# Patient Record
Sex: MALE | Race: WHITE | NOT HISPANIC OR LATINO | Employment: OTHER | URBAN - METROPOLITAN AREA
[De-identification: names, ages, dates, MRNs, and addresses within clinical notes are randomized per-mention and may not be internally consistent; named-entity substitution may affect disease eponyms.]

---

## 2017-01-04 ENCOUNTER — OFFICE VISIT (OUTPATIENT)
Dept: NEUROSURGERY | Facility: CLINIC | Age: 57
End: 2017-01-04
Payer: COMMERCIAL

## 2017-01-04 VITALS
DIASTOLIC BLOOD PRESSURE: 82 MMHG | TEMPERATURE: 98 F | SYSTOLIC BLOOD PRESSURE: 122 MMHG | HEART RATE: 63 BPM | BODY MASS INDEX: 27.86 KG/M2 | WEIGHT: 199 LBS | HEIGHT: 71 IN

## 2017-01-04 DIAGNOSIS — M48.061 LUMBAR STENOSIS: Primary | ICD-10-CM

## 2017-01-04 DIAGNOSIS — M71.30: ICD-10-CM

## 2017-01-04 PROCEDURE — 99999 PR PBB SHADOW E&M-EST. PATIENT-LVL III: CPT | Mod: PBBFAC,,, | Performed by: NEUROLOGICAL SURGERY

## 2017-01-04 PROCEDURE — 99024 POSTOP FOLLOW-UP VISIT: CPT | Mod: S$GLB,,, | Performed by: NEUROLOGICAL SURGERY

## 2017-01-04 NOTE — PROGRESS NOTES
"Subjective:    I, Keesha Griffiths, am scribing for, and in the presence of, Dr. Silas Castillo.     Patient ID: Ramone Nguyen is a 56 y.o. male.    Chief Complaint: Follow-up    HPI This is a 56-year-old man who presents today 7 weeks PO L3-L4 laminectomy and medial facetectomy with decompression of nerve roots and resection of synovial cyst on 11/14/2016.  He describes persistent burning in the bilateral posterior thighs, but states the foot pain has improved. Otherwise, he is doing well. He states that the incision is healing well.    The patient is fairly active and walking near a mile.     Review of Systems   Constitutional: Negative for activity change, fatigue and fever.   HENT: Negative for facial swelling.    Eyes: Negative.    Respiratory: Negative.    Cardiovascular: Negative.    Gastrointestinal: Negative for diarrhea, nausea and vomiting.   Genitourinary: Negative.    Musculoskeletal: Positive for myalgias. Negative for back pain and joint swelling.   Neurological: Negative for seizures, weakness, numbness and headaches.   Psychiatric/Behavioral: Negative.        Past Medical History   Diagnosis Date    Coronary artery disease     MI (myocardial infarction)      Objective:       Visit Vitals    /82    Pulse 63    Temp 98 °F (36.7 °C)    Ht 5' 11" (1.803 m)    Wt 90.3 kg (199 lb)    BMI 27.75 kg/m2       Physical Exam   Constitutional: He is oriented to person, place, and time. He appears well-developed and well-nourished.   HENT:   Head: Normocephalic and atraumatic.   Neck: Neck supple.   Neurological: He is alert and oriented to person, place, and time. No cranial nerve deficit. He displays a negative Romberg sign. GCS eye subscore is 4. GCS verbal subscore is 5. GCS motor subscore is 6.         I, Dr. Silas Castillo, personally performed the services described in this documentation as scribed by Keesha Griffiths in my presence, and it is both accurate and complete.  Assessment:       Lumbar " synovial cyst.    Plan:   The patient is doing well 6 weeks post-op L3-L4 laminectomy and medial facetectomy with decompression of nerve roots and resection of synovial cyst on 11/14/2016. I will schedule him follow up in 6 months. He should contact me sooner with any new issues or concerns.

## 2017-01-04 NOTE — MR AVS SNAPSHOT
[facility] - Neurosurgery   [address]  [facility] [address]  Phone: [phone]                  Ramone Nguyen   2017 11:15 AM   Office Visit    Description:  Male : 1960   Provider:  Silas Castillo MD   Department:  [facility] - Neurosurgery 7th Fl           Reason for Visit     Follow-up                To Do List           Goals (5 Years of Data)     None      [facility]     [facility] On Call Nurse Care Line -  Assistance  Registered nurses in the [facility] Center provide clinical advisement, health education, appointment booking, and other advisory services.  Call for this free service at [phone].             Medications           Message regarding Medications     Verify the changes and/or additions to your medication regime listed below are the same as discussed with your clinician today.  If any of these changes or additions are incorrect, please notify your healthcare provider.             Verify that the below list of medications is an accurate representation of the medications you are currently taking.  If none reported, the list may be blank. If incorrect, please contact your healthcare provider. Carry this list with you in case of emergency.           Current Medications     aspirin (ECOTRIN) 81 MG EC tablet Take 1 tablet (81 mg total) by mouth once daily.    baclofen (LIORESAL) 10 MG tablet Take 10 mg by mouth 3 (three) times daily.     carvedilol (COREG) 12.5 MG tablet Take 1 tablet (12.5 mg total) by mouth 2 (two) times daily with meals.    cyanocobalamin, vitamin B-12, (VITAMIN B-12) 500 mcg TbSR Take by mouth.    fish oil-omega-3 fatty acids 300-1,000 mg capsule Take 2 g by mouth once daily.    hydrocodone-acetaminophen 7.5-325mg (NORCO) 7.5-325 mg per tablet Take 1 tablet by mouth every 4 (four) hours as needed.    LYRICA 75 mg capsule 75 mg 4 (four) times daily.     multivitamin capsule Take 1 capsule by mouth once daily.     rosuvastatin  "(CRESTOR) 20 MG tablet Take 1 tablet (20 mg total) by mouth every evening.    UBIQUINOL, BULK, MISC 100 mg by Misc.(Non-Drug; Combo Route) route once daily.            Clinical Reference Information           Vital Signs - Last Recorded  Most recent update: 1/4/2017 11:16 AM by Tina Shah MA    BP Pulse Temp Ht Wt BMI    122/82 63 98 °F (36.7 °C) 5' 11" (1.803 m) 90.3 kg (199 lb) 27.75 kg/m2      Blood Pressure          Most Recent Value    BP  122/82      Allergies as of 1/4/2017     Ace Inhibitors      Immunizations Administered on Date of Encounter - 1/4/2017     None      "

## 2017-05-24 ENCOUNTER — LAB VISIT (OUTPATIENT)
Dept: LAB | Facility: HOSPITAL | Age: 57
End: 2017-05-24
Attending: INTERNAL MEDICINE
Payer: OTHER MISCELLANEOUS

## 2017-05-24 DIAGNOSIS — E78.5 HYPERLIPEMIA: ICD-10-CM

## 2017-05-24 LAB
ALBUMIN SERPL BCP-MCNC: 3.9 G/DL
ALP SERPL-CCNC: 44 U/L
ALT SERPL W/O P-5'-P-CCNC: 24 U/L
ANION GAP SERPL CALC-SCNC: 7 MMOL/L
AST SERPL-CCNC: 26 U/L
BILIRUB SERPL-MCNC: 0.6 MG/DL
BUN SERPL-MCNC: 15 MG/DL
CALCIUM SERPL-MCNC: 9.4 MG/DL
CHLORIDE SERPL-SCNC: 107 MMOL/L
CHOLEST/HDLC SERPL: 2.9 {RATIO}
CO2 SERPL-SCNC: 26 MMOL/L
CREAT SERPL-MCNC: 0.9 MG/DL
EST. GFR  (AFRICAN AMERICAN): >60 ML/MIN/1.73 M^2
EST. GFR  (NON AFRICAN AMERICAN): >60 ML/MIN/1.73 M^2
GLUCOSE SERPL-MCNC: 90 MG/DL
HDL/CHOLESTEROL RATIO: 34.6 %
HDLC SERPL-MCNC: 156 MG/DL
HDLC SERPL-MCNC: 54 MG/DL
LDLC SERPL CALC-MCNC: 78.2 MG/DL
NONHDLC SERPL-MCNC: 102 MG/DL
POTASSIUM SERPL-SCNC: 4.1 MMOL/L
PROT SERPL-MCNC: 7.1 G/DL
SODIUM SERPL-SCNC: 140 MMOL/L
TRIGL SERPL-MCNC: 119 MG/DL

## 2017-05-24 PROCEDURE — 80061 LIPID PANEL: CPT

## 2017-05-24 PROCEDURE — 80053 COMPREHEN METABOLIC PANEL: CPT

## 2017-05-24 PROCEDURE — 36415 COLL VENOUS BLD VENIPUNCTURE: CPT | Mod: PO

## 2017-06-05 RX ORDER — ROSUVASTATIN CALCIUM 20 MG/1
20 TABLET, COATED ORAL NIGHTLY
Qty: 90 TABLET | Refills: 0 | Status: SHIPPED | OUTPATIENT
Start: 2017-06-05 | End: 2017-06-14 | Stop reason: SDUPTHER

## 2017-06-05 RX ORDER — CARVEDILOL 12.5 MG/1
12.5 TABLET ORAL 2 TIMES DAILY WITH MEALS
Qty: 180 TABLET | Refills: 0 | Status: SHIPPED | OUTPATIENT
Start: 2017-06-05 | End: 2017-06-14 | Stop reason: SDUPTHER

## 2017-06-14 ENCOUNTER — OFFICE VISIT (OUTPATIENT)
Dept: CARDIOLOGY | Facility: CLINIC | Age: 57
End: 2017-06-14
Payer: OTHER MISCELLANEOUS

## 2017-06-14 VITALS
HEART RATE: 64 BPM | HEIGHT: 70 IN | SYSTOLIC BLOOD PRESSURE: 131 MMHG | WEIGHT: 198.63 LBS | DIASTOLIC BLOOD PRESSURE: 86 MMHG | BODY MASS INDEX: 28.44 KG/M2

## 2017-06-14 DIAGNOSIS — I25.10 ATHEROSCLEROSIS OF NATIVE CORONARY ARTERY OF NATIVE HEART WITHOUT ANGINA PECTORIS: Primary | ICD-10-CM

## 2017-06-14 DIAGNOSIS — E78.5 HYPERLIPIDEMIA, UNSPECIFIED HYPERLIPIDEMIA TYPE: ICD-10-CM

## 2017-06-14 DIAGNOSIS — I25.5 CARDIOMYOPATHY, ISCHEMIC: ICD-10-CM

## 2017-06-14 DIAGNOSIS — Z98.61 S/P PTCA (PERCUTANEOUS TRANSLUMINAL CORONARY ANGIOPLASTY): ICD-10-CM

## 2017-06-14 DIAGNOSIS — I25.2 OLD MI (MYOCARDIAL INFARCTION): ICD-10-CM

## 2017-06-14 PROCEDURE — 99999 PR PBB SHADOW E&M-EST. PATIENT-LVL III: CPT | Mod: PBBFAC,,, | Performed by: INTERNAL MEDICINE

## 2017-06-14 PROCEDURE — 99213 OFFICE O/P EST LOW 20 MIN: CPT | Mod: S$GLB,,, | Performed by: INTERNAL MEDICINE

## 2017-06-14 RX ORDER — CARVEDILOL 12.5 MG/1
12.5 TABLET ORAL 2 TIMES DAILY WITH MEALS
Qty: 180 TABLET | Refills: 3 | Status: SHIPPED | OUTPATIENT
Start: 2017-06-14 | End: 2017-08-22 | Stop reason: SDUPTHER

## 2017-06-14 RX ORDER — ROSUVASTATIN CALCIUM 20 MG/1
20 TABLET, COATED ORAL NIGHTLY
Qty: 90 TABLET | Refills: 3 | Status: SHIPPED | OUTPATIENT
Start: 2017-06-14 | End: 2017-08-22 | Stop reason: SDUPTHER

## 2017-06-14 NOTE — PROGRESS NOTES
Subjective:    Patient ID:  Ramone Nguyen is a 56 y.o. male who presents for follow-up of Atherosclerosis of native coronary artery without angina pec      HPI  Mr. Nguyen has no symptoms to suggest exertional angina.. He has no    symptoms of chf. He has no symptoms of dysrhythmia. He has never had    syncope.       He did have an episode of chest discomfort described as an elephant sitting on his chest in March 2016.  He was evaluated at his local hospital in [place], [place].  There was no heart attack and apparently no evidence of new coronary events.  These symptoms have not recurred and he and his wife believe they're likely due to stress that he was having at the time.     He had recent cervical spine surgery and is recovering well.  No regular exercise - wt up a bit.   As noted previously, he notices a purplish discoloration to his right foot when he is sitting.  He has no claudication.       He developed angioedema in several locations with ace-i, and I am   reluctant to try an ARB.      He developed acute anterior MI in [facility] longterm 1997 with delay    getting to [facility] at which point LAD occluded and ptca performed. Went on    to develop large anterior scar. Was a  and cigarette smoker at    the time, both of which have been discontinued.     CHRONIC CONDITIONS:    - Old myocardial infarction    - CHR ISCHEMIC HRT DIS NEC - with decision made by EP not to place AICD    Contraindication to ace-i and ARB    - Other and unspecified hyperlipidemia - not at goal but has had    problems symptomatically with higher dose statin and wants to try better    diet, wt loss, etc.    - Percutaneous transluminal coronary angioplasty status    - Coronary atherosclerosis of native coronary vessel     05/24/17 0854 HDL 54 - Final result   05/24/17 0854 CHOL 156 - Final result   05/24/17 0854 TRIG 119 - Final result   05/24/17 0854 LDLCALC 78.2 - Final result   Liver wnl    Current Outpatient  "Prescriptions   Medication Sig Dispense Refill    aspirin (ECOTRIN) 81 MG EC tablet Take 1 tablet (81 mg total) by mouth once daily.  0    baclofen (LIORESAL) 10 MG tablet Take 10 mg by mouth 3 (three) times daily.       carvedilol (COREG) 12.5 MG tablet Take 1 tablet (12.5 mg total) by mouth 2 (two) times daily with meals. 180 tablet 0    cyanocobalamin, vitamin B-12, (VITAMIN B-12) 500 mcg TbSR Take by mouth.      fish oil-omega-3 fatty acids 300-1,000 mg capsule Take 2 g by mouth once daily.      hydrocodone-acetaminophen 7.5-325mg (NORCO) 7.5-325 mg per tablet Take 1 tablet by mouth every 4 (four) hours as needed. 90 tablet 0    LYRICA 75 mg capsule 75 mg 4 (four) times daily.       multivitamin capsule Take 1 capsule by mouth once daily.       rosuvastatin (CRESTOR) 20 MG tablet Take 1 tablet (20 mg total) by mouth every evening. 90 tablet 0    UBIQUINOL, BULK, MISC 100 mg by Misc.(Non-Drug; Combo Route) route once daily.        No current facility-administered medications for this visit.             Review of Systems   Constitution: Positive for malaise/fatigue. Negative for night sweats, weight gain and weight loss.   HENT: Negative for headaches.    Eyes: Negative for visual disturbance.   Cardiovascular: Negative for chest pain, dyspnea on exertion, leg swelling, palpitations and syncope.   Respiratory: Negative for cough and shortness of breath.    Hematologic/Lymphatic: Does not bruise/bleed easily.   Skin: Negative for rash.   Musculoskeletal: Positive for back pain and neck pain.   Gastrointestinal: Negative for abdominal pain, change in bowel habit and nausea.   Neurological: Positive for paresthesias. Negative for dizziness and light-headedness.        Objective:    Physical Exam   Constitutional:   /86 (BP Location: Left arm, Patient Position: Sitting, BP Method: Automatic)   Pulse 64   Ht 5' 10" (1.778 m)   Wt 90.1 kg (198 lb 10.2 oz)   BMI 28.50 kg/m²      Neck: No JVD present. "   Cardiovascular: Normal rate, regular rhythm, normal heart sounds and intact distal pulses.  Exam reveals no gallop.    No murmur heard.  Pulmonary/Chest: Breath sounds normal. He has no rales.   Abdominal: Soft. Bowel sounds are normal.   Musculoskeletal: He exhibits no edema.         Assessment:       1. Atherosclerosis of native coronary artery of native heart without angina pectoris    2. Cardiomyopathy, ischemic - functional class !   3. Old MI (myocardial infarction)    4. Hyperlipidemia, unspecified hyperlipidemia type - adequate control   5. S/P PTCA (percutaneous transluminal coronary angioplasty)         Plan:

## 2017-07-05 ENCOUNTER — TELEPHONE (OUTPATIENT)
Dept: NEUROSURGERY | Facility: CLINIC | Age: 57
End: 2017-07-05

## 2017-07-05 DIAGNOSIS — G95.9 CERVICAL MYELOPATHY: Primary | ICD-10-CM

## 2017-07-13 ENCOUNTER — TELEPHONE (OUTPATIENT)
Dept: NEUROSURGERY | Facility: CLINIC | Age: 57
End: 2017-07-13

## 2017-07-13 DIAGNOSIS — M71.38 SYNOVIAL CYST OF LUMBAR SPINE: Primary | ICD-10-CM

## 2017-07-13 DIAGNOSIS — M48.061 LUMBAR STENOSIS: Primary | ICD-10-CM

## 2017-07-14 ENCOUNTER — TELEPHONE (OUTPATIENT)
Dept: NEUROSURGERY | Facility: CLINIC | Age: 57
End: 2017-07-14

## 2017-07-14 NOTE — TELEPHONE ENCOUNTER
Called pt to let him know that things were a go with both MRI for 7/18/2017  Pt wife answered and was very pleased with results.

## 2017-07-19 ENCOUNTER — OFFICE VISIT (OUTPATIENT)
Dept: NEUROSURGERY | Facility: CLINIC | Age: 57
End: 2017-07-19
Payer: MEDICARE

## 2017-07-19 ENCOUNTER — HOSPITAL ENCOUNTER (OUTPATIENT)
Dept: RADIOLOGY | Facility: HOSPITAL | Age: 57
Discharge: HOME | End: 2017-07-19
Attending: NEUROLOGICAL SURGERY
Payer: MEDICARE

## 2017-07-19 DIAGNOSIS — M54.16 LUMBAR RADICULOPATHY: Primary | ICD-10-CM

## 2017-07-19 DIAGNOSIS — M48.061 LUMBAR STENOSIS: ICD-10-CM

## 2017-07-19 DIAGNOSIS — G95.9 CERVICAL MYELOPATHY: ICD-10-CM

## 2017-07-19 PROCEDURE — 72156 MRI NECK SPINE W/O & W/DYE: CPT | Mod: 26,GC,, | Performed by: RADIOLOGY

## 2017-07-19 PROCEDURE — 99212 OFFICE O/P EST SF 10 MIN: CPT | Mod: PBBFAC,25 | Performed by: NEUROLOGICAL SURGERY

## 2017-07-19 PROCEDURE — 72158 MRI LUMBAR SPINE W/O & W/DYE: CPT | Mod: 26,GC,, | Performed by: RADIOLOGY

## 2017-07-19 PROCEDURE — 99213 OFFICE O/P EST LOW 20 MIN: CPT | Mod: S$PBB,,, | Performed by: NEUROLOGICAL SURGERY

## 2017-07-19 PROCEDURE — 99999 PR PBB SHADOW E&M-EST. PATIENT-LVL II: CPT | Mod: PBBFAC,,, | Performed by: NEUROLOGICAL SURGERY

## 2017-07-19 RX ORDER — GADOBUTROL 604.72 MG/ML
10 INJECTION INTRAVENOUS
Status: COMPLETED | OUTPATIENT
Start: 2017-07-19 | End: 2017-07-19

## 2017-07-19 RX ADMIN — GADOBUTROL 10 ML: 604.72 INJECTION INTRAVENOUS at 01:07

## 2017-07-19 NOTE — PROGRESS NOTES
Subjective:    I, Keesha Whitley, am scribing for, and in the presence of, Dr. Silas Castillo.     Patient ID: Ramone Nguyen is a 56 y.o. male.    Chief Complaint: No chief complaint on file.    HPI This is a 56-year-old man who presents today for follow up. The patient underwent a L3-L4 laminectomy and medial facetectomy with decompression of nerve roots and resection of synovial cyst on 11/14/2016. At the postop visit, he complained of burning in the bilateral posterior thighs. Today, the patient reports intermittent, but persistent BLE pain that radiates into his toes with some difficulty ambulating. He states that activity exacerbates his pain. He reports taking Norco 2-3 times per week as needed for episodes of severe pain. He also takes Lyrica. The patient also complains of intermittent numbness in the LUE and tingling on his scalp.     Review of Systems   Constitutional: Negative for activity change, fatigue and fever.   HENT: Negative for facial swelling.    Eyes: Negative.    Respiratory: Negative.    Cardiovascular: Negative.    Gastrointestinal: Negative for diarrhea, nausea and vomiting.   Genitourinary: Negative.    Musculoskeletal: Positive for gait problem and myalgias (BLE). Negative for back pain and joint swelling.   Neurological: Positive for numbness (LUE). Negative for seizures, weakness and headaches.        +Tingling on his scalp   Psychiatric/Behavioral: Negative.        Past Medical History:   Diagnosis Date    Coronary artery disease     MI (myocardial infarction)        Objective:     There were no vitals taken for this visit.    Physical Exam   Constitutional: He is oriented to person, place, and time. He appears well-developed and well-nourished.   HENT:   Head: Normocephalic and atraumatic.   Neck: Neck supple.   Neurological: He is alert and oriented to person, place, and time. No cranial nerve deficit. He displays a negative Romberg sign. GCS eye subscore is 4. GCS verbal subscore  is 5. GCS motor subscore is 6.       Imaging:  MRI of the lumbar spine w/wo contrast, dated 7/19/2017, shows some degenerative changes at L5/S1 without spinal stenosis.     MRI of the cervical spine w/wo contrast, dated 7/19/2017, shows no significant spinal stenosis or neural foraminal stenosis.     I have personally reviewed the images with the pt.      I, Dr. Silas Castillo, personally performed the services described in this documentation as scribed by Keesha Whitley in my presence, and it is both accurate and complete.  Assessment:       1. Lumbar radiculopathy        Plan:   At this point, I do not recommend any other surgical options for this patient. I will refer the patient to Pain Management for treatment. He can follow up with me as needed for any new symptoms or concerns.

## 2017-07-19 NOTE — LETTER
July 21, 2017      Danilo Weinstein MD  [address]  [facility] [address]           [facility] 7th Fl  [address]  [place] [address]  Phone: [phone]          Patient: Ramone Nguyen   MR Number: [id number]   YOB: 1960   Date of Visit: 7/19/2017       Dear Dr. Danilo Weinstein:    Thank you for referring Ramone Nguyen to me for evaluation. Attached you will find relevant portions of my assessment and plan of care.    If you have questions, please do not hesitate to call me. I look forward to following Ramone Nguyen along with you.    Sincerely,    Silas Castillo MD    Enclosure  CC:  No Recipients    If you would like to receive this communication electronically, please contact [email or web address] or [phone] to request more information on [employer] access.    For providers and/or their staff who would like to refer a patient to [facility], please contact us through our one-stop-shop provider referral line, [facility], at [phone].    If you feel you have received this communication in error or would no longer like to receive these types of communications, please e-mail [email or web address]

## 2017-07-19 NOTE — PATIENT INSTRUCTIONS
At this point, I do not recommend any other surgical options for this patient. I will refer the patient to Pain Management for treatment. He can follow up with me as needed for any new symptoms or concerns.

## 2017-08-22 RX ORDER — CARVEDILOL 12.5 MG/1
12.5 TABLET ORAL 2 TIMES DAILY WITH MEALS
Qty: 180 TABLET | Refills: 3 | Status: SHIPPED | OUTPATIENT
Start: 2017-08-22 | End: 2018-08-31 | Stop reason: SDUPTHER

## 2017-08-22 RX ORDER — ROSUVASTATIN CALCIUM 20 MG/1
20 TABLET, COATED ORAL NIGHTLY
Qty: 90 TABLET | Refills: 3 | Status: SHIPPED | OUTPATIENT
Start: 2017-08-22 | End: 2018-08-30 | Stop reason: SDUPTHER

## 2017-08-22 NOTE — TELEPHONE ENCOUNTER
----- Message from Conchita Kim sent at 8/22/2017  9:33 AM CDT -----  Contact: pt   Pt called to get refills on the rosuvastatin (CRESTOR) 20 MG tablet and the carvedilol (COREG) 12.5 MG tablet.  The pt is requesting 90 day refill sent to the [id number] - [place], [place] - [address]  He is a pt of Dr. Gordon and LOV 6/17.  The pt can be reached @ [phone].  Thanks!!

## 2018-04-25 ENCOUNTER — TELEPHONE (OUTPATIENT)
Dept: CARDIOLOGY | Facility: CLINIC | Age: 58
End: 2018-04-25

## 2018-04-25 NOTE — TELEPHONE ENCOUNTER
----- Message from Jone Vasques sent at 4/25/2018  8:34 AM CDT -----  Contact: patient  Please call pt at [phone]. Patient is requesting an appt with Dr Gordon only and stated that Dr Gordon will see him    Thank you

## 2018-05-08 ENCOUNTER — LAB VISIT (OUTPATIENT)
Dept: LAB | Facility: HOSPITAL | Age: 58
End: 2018-05-08
Attending: INTERNAL MEDICINE
Payer: OTHER MISCELLANEOUS

## 2018-05-08 DIAGNOSIS — E78.5 HYPERLIPIDEMIA, UNSPECIFIED HYPERLIPIDEMIA TYPE: ICD-10-CM

## 2018-05-08 DIAGNOSIS — I25.10 ATHEROSCLEROSIS OF NATIVE CORONARY ARTERY OF NATIVE HEART WITHOUT ANGINA PECTORIS: ICD-10-CM

## 2018-05-08 LAB
ALBUMIN SERPL BCP-MCNC: 3.9 G/DL
ALP SERPL-CCNC: 49 U/L
ALT SERPL W/O P-5'-P-CCNC: 28 U/L
ANION GAP SERPL CALC-SCNC: 10 MMOL/L
AST SERPL-CCNC: 30 U/L
BILIRUB SERPL-MCNC: 0.7 MG/DL
BUN SERPL-MCNC: 13 MG/DL
CALCIUM SERPL-MCNC: 9.7 MG/DL
CHLORIDE SERPL-SCNC: 106 MMOL/L
CHOLEST SERPL-MCNC: 141 MG/DL
CHOLEST/HDLC SERPL: 2.9 {RATIO}
CO2 SERPL-SCNC: 25 MMOL/L
CREAT SERPL-MCNC: 0.9 MG/DL
EST. GFR  (AFRICAN AMERICAN): >60 ML/MIN/1.73 M^2
EST. GFR  (NON AFRICAN AMERICAN): >60 ML/MIN/1.73 M^2
GLUCOSE SERPL-MCNC: 93 MG/DL
HDLC SERPL-MCNC: 48 MG/DL
HDLC SERPL: 34 %
LDLC SERPL CALC-MCNC: 65.6 MG/DL
NONHDLC SERPL-MCNC: 93 MG/DL
POTASSIUM SERPL-SCNC: 4.3 MMOL/L
PROT SERPL-MCNC: 7.2 G/DL
SODIUM SERPL-SCNC: 141 MMOL/L
TRIGL SERPL-MCNC: 137 MG/DL

## 2018-05-08 PROCEDURE — 80061 LIPID PANEL: CPT

## 2018-05-08 PROCEDURE — 80053 COMPREHEN METABOLIC PANEL: CPT

## 2018-05-08 PROCEDURE — 36415 COLL VENOUS BLD VENIPUNCTURE: CPT | Mod: PO

## 2018-05-15 ENCOUNTER — OFFICE VISIT (OUTPATIENT)
Dept: CARDIOLOGY | Facility: CLINIC | Age: 58
End: 2018-05-15
Payer: OTHER MISCELLANEOUS

## 2018-05-15 VITALS
SYSTOLIC BLOOD PRESSURE: 137 MMHG | HEIGHT: 70 IN | WEIGHT: 203.25 LBS | DIASTOLIC BLOOD PRESSURE: 81 MMHG | BODY MASS INDEX: 29.1 KG/M2 | HEART RATE: 69 BPM

## 2018-05-15 DIAGNOSIS — I25.5 CARDIOMYOPATHY, ISCHEMIC: ICD-10-CM

## 2018-05-15 DIAGNOSIS — I25.2 OLD MI (MYOCARDIAL INFARCTION): ICD-10-CM

## 2018-05-15 DIAGNOSIS — Z98.61 S/P PTCA (PERCUTANEOUS TRANSLUMINAL CORONARY ANGIOPLASTY): ICD-10-CM

## 2018-05-15 DIAGNOSIS — I25.10 ATHEROSCLEROSIS OF NATIVE CORONARY ARTERY OF NATIVE HEART WITHOUT ANGINA PECTORIS: Primary | ICD-10-CM

## 2018-05-15 DIAGNOSIS — E78.5 HYPERLIPIDEMIA, UNSPECIFIED HYPERLIPIDEMIA TYPE: ICD-10-CM

## 2018-05-15 PROCEDURE — 99213 OFFICE O/P EST LOW 20 MIN: CPT | Mod: S$GLB,,, | Performed by: INTERNAL MEDICINE

## 2018-05-15 PROCEDURE — 99999 PR PBB SHADOW E&M-EST. PATIENT-LVL III: CPT | Mod: PBBFAC,,, | Performed by: INTERNAL MEDICINE

## 2018-08-30 RX ORDER — ROSUVASTATIN CALCIUM 20 MG/1
20 TABLET, COATED ORAL NIGHTLY
Qty: 90 TABLET | Refills: 3 | Status: SHIPPED | OUTPATIENT
Start: 2018-08-30 | End: 2019-08-16 | Stop reason: SDUPTHER

## 2018-08-30 NOTE — TELEPHONE ENCOUNTER
----- Message from Bess Green sent at 8/30/2018  3:31 PM CDT -----  Contact: pt called   Pt need need a refill on rosuvastatin (CRESTOR) 20 MG tablet, carvedilol (COREG) 12.5 MG tablet and send to [facility] [place] - [place], [place] - [address] [place] [phone] (Phone)[phone] (Fax). Last visit  5/15/18 Dr. Gordon. Thank you.

## 2018-08-31 RX ORDER — CARVEDILOL 12.5 MG/1
12.5 TABLET ORAL 2 TIMES DAILY WITH MEALS
Qty: 180 TABLET | Refills: 3 | Status: SHIPPED | OUTPATIENT
Start: 2018-08-31 | End: 2019-08-16 | Stop reason: SDUPTHER

## 2018-08-31 NOTE — TELEPHONE ENCOUNTER
----- Message from Bess Green sent at 8/31/2018 10:11 AM CDT -----  Contact: pt called   Pt need a refill on medication carvedilol (COREG) 12.5 MG tablet and send to [facility] [place] - [place], [place] - [address] [place] [phone] (Phone)  [phone] (Fax). Last visit 5/15/18 Dr. Gordon. Thank you.

## 2019-01-08 ENCOUNTER — TELEPHONE (OUTPATIENT)
Dept: CARDIOLOGY | Facility: CLINIC | Age: 59
End: 2019-01-08

## 2019-01-08 NOTE — TELEPHONE ENCOUNTER
----- Message from Bess Green sent at 1/8/2019  3:57 PM CST -----  Contact: Maryann Wolf has been calling several times regarding a workmans  comp issue. Please call Maryann @ [phone] and fax [phone]. Thank you.

## 2019-05-08 ENCOUNTER — LAB VISIT (OUTPATIENT)
Dept: LAB | Facility: HOSPITAL | Age: 59
End: 2019-05-08
Attending: INTERNAL MEDICINE
Payer: OTHER MISCELLANEOUS

## 2019-05-08 DIAGNOSIS — I25.10 ATHEROSCLEROSIS OF NATIVE CORONARY ARTERY OF NATIVE HEART WITHOUT ANGINA PECTORIS: ICD-10-CM

## 2019-05-08 DIAGNOSIS — E78.5 HYPERLIPIDEMIA, UNSPECIFIED HYPERLIPIDEMIA TYPE: ICD-10-CM

## 2019-05-08 LAB
ALBUMIN SERPL BCP-MCNC: 4 G/DL (ref 3.5–5.2)
ALP SERPL-CCNC: 47 U/L (ref 55–135)
ALT SERPL W/O P-5'-P-CCNC: 27 U/L (ref 10–44)
ANION GAP SERPL CALC-SCNC: 7 MMOL/L (ref 8–16)
AST SERPL-CCNC: 28 U/L (ref 10–40)
BASOPHILS # BLD AUTO: 0.04 K/UL (ref 0–0.2)
BASOPHILS NFR BLD: 0.7 % (ref 0–1.9)
BILIRUB SERPL-MCNC: 0.6 MG/DL (ref 0.1–1)
BUN SERPL-MCNC: 19 MG/DL (ref 6–20)
CALCIUM SERPL-MCNC: 9.8 MG/DL (ref 8.7–10.5)
CHLORIDE SERPL-SCNC: 104 MMOL/L (ref 95–110)
CHOLEST SERPL-MCNC: 165 MG/DL (ref 120–199)
CHOLEST/HDLC SERPL: 3 {RATIO} (ref 2–5)
CO2 SERPL-SCNC: 29 MMOL/L (ref 23–29)
CREAT SERPL-MCNC: 0.9 MG/DL (ref 0.5–1.4)
DIFFERENTIAL METHOD: ABNORMAL
EOSINOPHIL # BLD AUTO: 0.1 K/UL (ref 0–0.5)
EOSINOPHIL NFR BLD: 2.4 % (ref 0–8)
ERYTHROCYTE [DISTWIDTH] IN BLOOD BY AUTOMATED COUNT: 12.7 % (ref 11.5–14.5)
EST. GFR  (AFRICAN AMERICAN): >60 ML/MIN/1.73 M^2
EST. GFR  (NON AFRICAN AMERICAN): >60 ML/MIN/1.73 M^2
GLUCOSE SERPL-MCNC: 79 MG/DL (ref 70–110)
HCT VFR BLD AUTO: 50 % (ref 40–54)
HDLC SERPL-MCNC: 55 MG/DL (ref 40–75)
HDLC SERPL: 33.3 % (ref 20–50)
HGB BLD-MCNC: 16.7 G/DL (ref 14–18)
IMM GRANULOCYTES # BLD AUTO: 0.01 K/UL (ref 0–0.04)
IMM GRANULOCYTES NFR BLD AUTO: 0.2 % (ref 0–0.5)
LDLC SERPL CALC-MCNC: 83.4 MG/DL (ref 63–159)
LYMPHOCYTES # BLD AUTO: 2 K/UL (ref 1–4.8)
LYMPHOCYTES NFR BLD: 35.1 % (ref 18–48)
MCH RBC QN AUTO: 31.9 PG (ref 27–31)
MCHC RBC AUTO-ENTMCNC: 33.4 G/DL (ref 32–36)
MCV RBC AUTO: 96 FL (ref 82–98)
MONOCYTES # BLD AUTO: 0.5 K/UL (ref 0.3–1)
MONOCYTES NFR BLD: 8.9 % (ref 4–15)
NEUTROPHILS # BLD AUTO: 3 K/UL (ref 1.8–7.7)
NEUTROPHILS NFR BLD: 52.7 % (ref 38–73)
NONHDLC SERPL-MCNC: 110 MG/DL
NRBC BLD-RTO: 0 /100 WBC
PLATELET # BLD AUTO: 162 K/UL (ref 150–350)
PMV BLD AUTO: 10.7 FL (ref 9.2–12.9)
POTASSIUM SERPL-SCNC: 4.3 MMOL/L (ref 3.5–5.1)
PROT SERPL-MCNC: 7.3 G/DL (ref 6–8.4)
RBC # BLD AUTO: 5.23 M/UL (ref 4.6–6.2)
SODIUM SERPL-SCNC: 140 MMOL/L (ref 136–145)
TRIGL SERPL-MCNC: 133 MG/DL (ref 30–150)
WBC # BLD AUTO: 5.75 K/UL (ref 3.9–12.7)

## 2019-05-08 PROCEDURE — 80053 COMPREHEN METABOLIC PANEL: CPT

## 2019-05-08 PROCEDURE — 80061 LIPID PANEL: CPT

## 2019-05-08 PROCEDURE — 85025 COMPLETE CBC W/AUTO DIFF WBC: CPT

## 2019-05-08 PROCEDURE — 36415 COLL VENOUS BLD VENIPUNCTURE: CPT | Mod: PO

## 2019-05-13 ENCOUNTER — LAB VISIT (OUTPATIENT)
Dept: LAB | Facility: HOSPITAL | Age: 59
End: 2019-05-13
Attending: INTERNAL MEDICINE
Payer: MEDICARE

## 2019-05-13 ENCOUNTER — OFFICE VISIT (OUTPATIENT)
Dept: CARDIOLOGY | Facility: CLINIC | Age: 59
End: 2019-05-13
Payer: MEDICARE

## 2019-05-13 VITALS
DIASTOLIC BLOOD PRESSURE: 74 MMHG | SYSTOLIC BLOOD PRESSURE: 130 MMHG | HEART RATE: 58 BPM | OXYGEN SATURATION: 97 % | BODY MASS INDEX: 29.19 KG/M2 | WEIGHT: 197.06 LBS | HEIGHT: 69 IN

## 2019-05-13 DIAGNOSIS — E89.0 POSTABLATIVE HYPOTHYROIDISM: ICD-10-CM

## 2019-05-13 DIAGNOSIS — I25.10 ATHEROSCLEROSIS OF NATIVE CORONARY ARTERY OF NATIVE HEART WITHOUT ANGINA PECTORIS: Primary | ICD-10-CM

## 2019-05-13 DIAGNOSIS — E78.5 HYPERLIPIDEMIA, UNSPECIFIED HYPERLIPIDEMIA TYPE: ICD-10-CM

## 2019-05-13 DIAGNOSIS — I25.5 CARDIOMYOPATHY, ISCHEMIC: ICD-10-CM

## 2019-05-13 DIAGNOSIS — E55.9 VITAMIN D DEFICIENCY: ICD-10-CM

## 2019-05-13 DIAGNOSIS — Z78.9 STATIN INTOLERANCE: ICD-10-CM

## 2019-05-13 DIAGNOSIS — G95.9 CERVICAL MYELOPATHY: ICD-10-CM

## 2019-05-13 DIAGNOSIS — I10 ESSENTIAL HYPERTENSION: ICD-10-CM

## 2019-05-13 LAB
25(OH)D3+25(OH)D2 SERPL-MCNC: 27 NG/ML (ref 30–96)
TSH SERPL DL<=0.005 MIU/L-ACNC: 1.69 UIU/ML (ref 0.4–4)

## 2019-05-13 PROCEDURE — 99999 PR PBB SHADOW E&M-EST. PATIENT-LVL III: CPT | Mod: PBBFAC,,, | Performed by: INTERNAL MEDICINE

## 2019-05-13 PROCEDURE — 99214 PR OFFICE/OUTPT VISIT, EST, LEVL IV, 30-39 MIN: ICD-10-PCS | Mod: S$PBB,,, | Performed by: INTERNAL MEDICINE

## 2019-05-13 PROCEDURE — 36415 COLL VENOUS BLD VENIPUNCTURE: CPT

## 2019-05-13 PROCEDURE — 99214 OFFICE O/P EST MOD 30 MIN: CPT | Mod: S$PBB,,, | Performed by: INTERNAL MEDICINE

## 2019-05-13 PROCEDURE — 82306 VITAMIN D 25 HYDROXY: CPT

## 2019-05-13 PROCEDURE — 84443 ASSAY THYROID STIM HORMONE: CPT

## 2019-05-13 PROCEDURE — 99999 PR PBB SHADOW E&M-EST. PATIENT-LVL III: ICD-10-PCS | Mod: PBBFAC,,, | Performed by: INTERNAL MEDICINE

## 2019-05-13 PROCEDURE — 99213 OFFICE O/P EST LOW 20 MIN: CPT | Mod: PBBFAC | Performed by: INTERNAL MEDICINE

## 2019-05-13 NOTE — PROGRESS NOTES
Subjective:   Chief Complaint: Atherosclerosis of native coronary artery of native heart wi and Congestive Heart Failure  Last Clinic Visit:  05/15/2018 with Dr. Gordon    History of Present Illness: Ramone Nguyen is a 58 y.o. gentleman with coronary artery disease, ischemic cardiomyopathy, hypertension, hyperlipidemia, cervical arthritis, lumbar arthritis, who presents to follow-up, he was most recently seen one year prior by Dr. Gordon.  He reports largely doing well within the past year, denies any recurrent chest pain, no change in baseline shortness of breath.  He continues to report ongoing mild fatigue, but no orthopnea, no lower extremity edema.  He denies any claudication, does get some cramps and discoloration in his right leg during sleep, but this is unchanged.  He reports compliance with his medications, also watching his diet, attempts to limited excessive red meat or saturated fats.  He is somewhat physically active but does not exercise aerobically on a regular basis secondary to orthopedic issues.  He has Ace induced angioedema, thus no additional role for these medications.  Have started Aldactone for cardiomyopathy in the past, but now stopped.  He also has declined ICD in the past.  He denies any syncope, no presyncope, no palpitations or other symptoms concerning for arrhythmia.  LDL noted to have creeped up slightly over the course the past year to the 80s from 60s, he endorses compliance with Crestor 20 which he has been on for several years    From Dr. Gordon's last note:  He developed acute anterior MI in [facility] skilled nursing 1997 with delay    getting to [facility] at which point LAD occluded and ptca performed. Went on    to develop large anterior scar. Was a  and cigarette smoker at    the time, both of which have been discontinued.  - CHR ISCHEMIC HRT DIS NEC - with decision made by EP not to place AICD    Contraindication to ace-i and ARB    - Other and unspecified  hyperlipidemia - not at goal but has had    problems symptomatically with higher dose statin and wants to try better    diet, wt loss, etc.      PMHx:  ACE-inhibitor angioedema  Ischemic cardiomyopathy ejection fraction 30% 2012  Coronary artery disease status post delayed revascularization 1997, PCI LAD  History of tobacco use  Chronic aches in arms and legs secondary to myelopathy versus statin  Osteoarthritis    Review of Systems   Constitution: Positive for malaise/fatigue.   HENT: Negative.    Eyes: Negative.    Cardiovascular: Negative.    Respiratory: Negative.    Hematologic/Lymphatic: Negative.    Skin: Negative.    Musculoskeletal: Negative.    Gastrointestinal: Negative.    Genitourinary: Negative.      Medications:  Current Outpatient Medications on File Prior to Visit   Medication Sig    aspirin (ECOTRIN) 81 MG EC tablet Take 1 tablet (81 mg total) by mouth once daily.    baclofen (LIORESAL) 10 MG tablet Take 10 mg by mouth 3 (three) times daily.     carvedilol (COREG) 12.5 MG tablet Take 1 tablet (12.5 mg total) by mouth 2 (two) times daily with meals.    cyanocobalamin, vitamin B-12, (VITAMIN B-12) 500 mcg TbSR Take by mouth.    fish oil-omega-3 fatty acids 300-1,000 mg capsule Take 2 g by mouth once daily.    hydrocodone-acetaminophen 7.5-325mg (NORCO) 7.5-325 mg per tablet Take 1 tablet by mouth every 4 (four) hours as needed.    LYRICA 75 mg capsule 75 mg 4 (four) times daily.     multivitamin capsule Take 1 capsule by mouth once daily.     rosuvastatin (CRESTOR) 20 MG tablet Take 1 tablet (20 mg total) by mouth every evening.    UBIQUINOL, BULK, MISC 100 mg by Misc.(Non-Drug; Combo Route) route once daily.      No current facility-administered medications on file prior to visit.      Family History:  Ramone's family history includes Hyperlipidemia in his mother.    Social History:  Ramone reports that he quit smoking about 22 years ago. He does not have any smokeless tobacco  "history on file. He reports that he drinks alcohol. He reports that he does not use drugs.    Objective:   /74   Pulse (!) 58   Ht 5' 9" (1.753 m)   Wt 89.4 kg (197 lb 1.5 oz)   SpO2 97%   BMI 29.11 kg/m²     Physical Exam   Constitutional: He is oriented to person, place, and time and well-developed, well-nourished, and in no distress. No distress.   HENT:   Head: Normocephalic and atraumatic.   Mouth/Throat: No oropharyngeal exudate.   Eyes: EOM are normal. No scleral icterus.   Neck: No JVD present. No tracheal deviation present. No thyromegaly present.   Cardiovascular: Normal rate and regular rhythm. Exam reveals no gallop and no friction rub.   No murmur heard.  2+ pulses bilateral lower extremities, but noted to have mildly delayed capillary refill right lower extremity   Pulmonary/Chest: Effort normal and breath sounds normal. No respiratory distress. He has no wheezes. He has no rales. He exhibits no tenderness.   Abdominal: Soft. He exhibits no distension. There is no tenderness. There is no rebound and no guarding.   Musculoskeletal: Normal range of motion. He exhibits no edema.   Neurological: He is alert and oriented to person, place, and time.   Skin: Skin is warm and dry. He is not diaphoretic. No erythema.   Psychiatric: Affect normal.     EKG:  My independent visualization of most recent EKG is normal sinus rhythm, anterior infarct with t-wave inversions across precordial leads.    TTE:  11/01/2012  CONCLUSIONS     1 - Mild left ventricular enlargement.     2 - Moderately depressed left ventricular function (EF 30%).     3 - RWMA.   SPECT  11/01/2012  Impression: ABNORMAL MYOCARDIAL PERFUSION  1. The perfusion scan is free of evidence for myocardial ischemia. Sensitivity is impaired due to beta blocker therapy.   2. There is a large size fixed defect of severe intensity that extends from the mid to the apical anterior and anteroseptal walls of the left ventricle, consistent with " myocardial injury.   3. There is abnormal wall motion at rest showing akinesis of the anterior,  anteroseptal and apical walls of the left ventricle.   4. There is resting LV dysfunction with a reduced ejection fraction of 34 %.  (normal is >= 51%)  5. The ventricular volumes are normal at rest and stress.   6. The extracardiac distribution of radioactivity is normal.   7. When compared to the previous study from 09/29/2011, no significant change.there are no significant interval changes in the perfusion pattern..     Lipids:  Recent Labs   Lab 05/08/19  0815   LDL CHOLESTEROL 83.4   HDL 55   CHOLESTEROL 165      Renal:  Recent Labs   Lab 05/08/19  0815   POTASSIUM 4.3   CO2 29   BUN BLD 19   CREATININE 0.9     Liver:  Recent Labs   Lab 05/08/19  0815   AST 28   ALT 27         Assessment:     1. Atherosclerosis of native coronary artery of native heart without angina pectoris    2. Cardiomyopathy, ischemic    3. Hyperlipidemia, unspecified hyperlipidemia type    4. Essential hypertension    5. Cervical myelopathy    6. Vitamin D deficiency        Plan:   1. Atherosclerosis of native coronary artery of native heart without angina pectoris  Will follow-up lipid panel in 6 months, encouraged diet with uptake in LDL.  Given extensive statin intolerance in the past explained other options such as Repatha which he was not interested in.  Also discussed Zetia, he reports being on this in the past but was stopped in 2014 for unclear reasons.  Will follow-up CK as it has been mildly elevated in the past  - Lipid panel; Future  - CK; Future    2. Cardiomyopathy, ischemic  Declined ICD, continue Coreg 12, have tried Aldactone in the past but has stopped    3. Hyperlipidemia, unspecified hyperlipidemia type  As above, rechecking lipids in 6 months after diet    4. Essential hypertension  Blood pressure at goal, he reports running in the 100-110 at home, encouraged him to get a blood pressure cuff and keep log    5. Cervical  myelopathy  Continue to follow with orthopedics    6. Vitamin D deficiency  Will check vitamin-D given statin intolerance, as well as TSH  - VITAMIN D; Future      Follow up in about 6 months (around 11/13/2019).

## 2019-05-14 ENCOUNTER — TELEPHONE (OUTPATIENT)
Dept: CARDIOLOGY | Facility: CLINIC | Age: 59
End: 2019-05-14

## 2019-05-14 DIAGNOSIS — R79.89 LOW VITAMIN D LEVEL: Primary | ICD-10-CM

## 2019-05-14 DIAGNOSIS — E55.9 VITAMIN D DEFICIENCY: ICD-10-CM

## 2019-05-14 RX ORDER — VIT C/E/ZN/COPPR/LUTEIN/ZEAXAN 250MG-90MG
1000 CAPSULE ORAL DAILY
Qty: 90 CAPSULE | Refills: 3 | Status: SHIPPED | OUTPATIENT
Start: 2019-05-14

## 2019-05-14 NOTE — TELEPHONE ENCOUNTER
Vitamin-D levels noted to be low, called explained association of low vitamin-D with statin intolerance will supplement vitamin-D, follow-up levels in 3 months    -Naatn Siegel

## 2019-05-15 ENCOUNTER — PATIENT MESSAGE (OUTPATIENT)
Dept: CARDIOLOGY | Facility: CLINIC | Age: 59
End: 2019-05-15

## 2019-08-16 RX ORDER — ROSUVASTATIN CALCIUM 20 MG/1
TABLET, COATED ORAL
Qty: 90 TABLET | Refills: 3 | Status: SHIPPED | OUTPATIENT
Start: 2019-08-16 | End: 2020-09-22

## 2019-08-16 RX ORDER — CARVEDILOL 12.5 MG/1
TABLET ORAL
Qty: 180 TABLET | Refills: 3 | Status: SHIPPED | OUTPATIENT
Start: 2019-08-16 | End: 2020-09-11

## 2019-11-11 ENCOUNTER — LAB VISIT (OUTPATIENT)
Dept: LAB | Facility: HOSPITAL | Age: 59
End: 2019-11-11
Attending: INTERNAL MEDICINE
Payer: MEDICARE

## 2019-11-11 DIAGNOSIS — I25.10 ATHEROSCLEROSIS OF NATIVE CORONARY ARTERY OF NATIVE HEART WITHOUT ANGINA PECTORIS: ICD-10-CM

## 2019-11-11 LAB
CHOLEST SERPL-MCNC: 151 MG/DL (ref 120–199)
CHOLEST/HDLC SERPL: 3.2 {RATIO} (ref 2–5)
CK SERPL-CCNC: 177 U/L (ref 20–200)
HDLC SERPL-MCNC: 47 MG/DL (ref 40–75)
HDLC SERPL: 31.1 % (ref 20–50)
LDLC SERPL CALC-MCNC: 78.2 MG/DL (ref 63–159)
NONHDLC SERPL-MCNC: 104 MG/DL
TRIGL SERPL-MCNC: 129 MG/DL (ref 30–150)

## 2019-11-11 PROCEDURE — 80061 LIPID PANEL: CPT

## 2019-11-11 PROCEDURE — 82550 ASSAY OF CK (CPK): CPT

## 2019-11-11 PROCEDURE — 36415 COLL VENOUS BLD VENIPUNCTURE: CPT | Mod: PO

## 2019-11-12 ENCOUNTER — OFFICE VISIT (OUTPATIENT)
Dept: CARDIOLOGY | Facility: CLINIC | Age: 59
End: 2019-11-12
Payer: OTHER MISCELLANEOUS

## 2019-11-12 VITALS
SYSTOLIC BLOOD PRESSURE: 149 MMHG | HEIGHT: 71 IN | DIASTOLIC BLOOD PRESSURE: 88 MMHG | WEIGHT: 200.19 LBS | BODY MASS INDEX: 28.02 KG/M2 | HEART RATE: 65 BPM

## 2019-11-12 DIAGNOSIS — E55.9 VITAMIN D DEFICIENCY: ICD-10-CM

## 2019-11-12 DIAGNOSIS — I25.5 CARDIOMYOPATHY, ISCHEMIC: Primary | ICD-10-CM

## 2019-11-12 DIAGNOSIS — I25.10 ATHEROSCLEROSIS OF NATIVE CORONARY ARTERY OF NATIVE HEART WITHOUT ANGINA PECTORIS: ICD-10-CM

## 2019-11-12 DIAGNOSIS — R79.89 LOW VITAMIN D LEVEL: ICD-10-CM

## 2019-11-12 DIAGNOSIS — E78.5 HYPERLIPIDEMIA, UNSPECIFIED HYPERLIPIDEMIA TYPE: ICD-10-CM

## 2019-11-12 DIAGNOSIS — I10 ESSENTIAL HYPERTENSION: ICD-10-CM

## 2019-11-12 PROCEDURE — 99214 PR OFFICE/OUTPT VISIT, EST, LEVL IV, 30-39 MIN: ICD-10-PCS | Mod: S$GLB,,, | Performed by: INTERNAL MEDICINE

## 2019-11-12 PROCEDURE — 99999 PR PBB SHADOW E&M-EST. PATIENT-LVL IV: ICD-10-PCS | Mod: PBBFAC,,, | Performed by: INTERNAL MEDICINE

## 2019-11-12 PROCEDURE — 99214 OFFICE O/P EST MOD 30 MIN: CPT | Mod: PBBFAC | Performed by: INTERNAL MEDICINE

## 2019-11-12 PROCEDURE — 99214 OFFICE O/P EST MOD 30 MIN: CPT | Mod: S$GLB,,, | Performed by: INTERNAL MEDICINE

## 2019-11-12 PROCEDURE — 99999 PR PBB SHADOW E&M-EST. PATIENT-LVL IV: CPT | Mod: PBBFAC,,, | Performed by: INTERNAL MEDICINE

## 2019-12-09 ENCOUNTER — PATIENT MESSAGE (OUTPATIENT)
Dept: CARDIOLOGY | Facility: CLINIC | Age: 59
End: 2019-12-09

## 2020-08-10 ENCOUNTER — TELEPHONE (OUTPATIENT)
Dept: NEUROSURGERY | Facility: CLINIC | Age: 60
End: 2020-08-10

## 2020-08-10 NOTE — TELEPHONE ENCOUNTER
Spoke with pt's wife. Pt was seen by neurologist recently for ongoing low back problems, MRI T&L done earlier today. Pt was told no remarkable findings on Thoracic MRI, increased narrowing @ L3/4 on Lumbar MRI. Pt was advised to f/u w/ Dr. Castillo given Hx of 2 prior surgeries with him at this level. Pt is picking up disc with copy of scan, appt made for 8/19. They are in agreement with plan.

## 2020-08-17 ENCOUNTER — OFFICE VISIT (OUTPATIENT)
Dept: NEUROSURGERY | Facility: CLINIC | Age: 60
End: 2020-08-17
Payer: MEDICARE

## 2020-08-17 VITALS
WEIGHT: 196.19 LBS | HEART RATE: 61 BPM | SYSTOLIC BLOOD PRESSURE: 136 MMHG | DIASTOLIC BLOOD PRESSURE: 87 MMHG | TEMPERATURE: 97 F | BODY MASS INDEX: 27.37 KG/M2

## 2020-08-17 DIAGNOSIS — M48.061 SPINAL STENOSIS OF LUMBAR REGION WITHOUT NEUROGENIC CLAUDICATION: Primary | ICD-10-CM

## 2020-08-17 PROCEDURE — 99999 PR PBB SHADOW E&M-EST. PATIENT-LVL IV: CPT | Mod: PBBFAC,,, | Performed by: NEUROLOGICAL SURGERY

## 2020-08-17 PROCEDURE — 99204 OFFICE O/P NEW MOD 45 MIN: CPT | Mod: S$PBB,,, | Performed by: NEUROLOGICAL SURGERY

## 2020-08-17 PROCEDURE — 99214 OFFICE O/P EST MOD 30 MIN: CPT | Mod: PBBFAC | Performed by: NEUROLOGICAL SURGERY

## 2020-08-17 PROCEDURE — 99999 PR PBB SHADOW E&M-EST. PATIENT-LVL IV: ICD-10-PCS | Mod: PBBFAC,,, | Performed by: NEUROLOGICAL SURGERY

## 2020-08-17 PROCEDURE — 99204 PR OFFICE/OUTPT VISIT, NEW, LEVL IV, 45-59 MIN: ICD-10-PCS | Mod: S$PBB,,, | Performed by: NEUROLOGICAL SURGERY

## 2020-08-17 NOTE — H&P (VIEW-ONLY)
Subjective:   I, Eze Holley, attest that this documentation has been prepared under the direction and in the presence of Silas Castillo MD.     Patient ID: Ramone Nguyen is a 59 y.o. male     Chief Complaint: cervical spondylosis, low back pain.      HPI  MrQuinton Nguyen is a pleasant 59 y.o. gentleman with a hx of a C3-4 and C6-7 ACDF on 11/11/2013, Left-sided L3-4 laminectomy for decompression on 04/20/2015, C4-5 ACDF on 05/16/2016, and left-sided L3-4 laminectomy and medial facetectomy with decompression of nerve roots and resection of synovial cyst on 11/14/2016. He presents today for follow up with MRI of lumbar spine.     Pt was last seen by me on 07/19/2017, at which time he complained of intermittent, but persistent BLE pain that radiates into his toes and some difficulty ambulating.    Pt states he has remained fairly active and is still able to mow his lawn. His worst pain at this time is in his left buttock which radiates into his LLE, terminating at the knee, and involving two of his toes. He believes he be more weak on the left side but the chronic weakness in his RLE has remained stable. He is currently on Lyrica 74 mg QID and Baclofen 10 mg TID. He states he was walking approximately 2 miles a day before summer emerged.        Review of Systems   Constitutional: Negative for activity change, appetite change, fatigue, fever and unexpected weight change.   HENT: Negative for facial swelling.    Eyes: Negative.    Respiratory: Negative.    Cardiovascular: Negative.    Gastrointestinal: Negative for diarrhea, nausea and vomiting.   Endocrine: Negative.    Genitourinary: Negative.    Musculoskeletal: Negative for back pain, joint swelling, myalgias and neck pain.        + left buttock and LLE pain   Neurological: Positive for weakness (in LLE; chronic in the RLE). Negative for dizziness, seizures, numbness and headaches.   Psychiatric/Behavioral: Negative.       Past Medical History:    Diagnosis Date    Coronary artery disease     Hyperlipidemia     Hypertension     MI (myocardial infarction)        Objective:      Vitals:    08/17/20 1317   BP: 136/87   Pulse: 61   Temp: 97.3 °F (36.3 °C)      Physical Exam  HENT:      Head: Normocephalic and atraumatic.   Eyes:      Extraocular Movements: Extraocular movements intact.   Neck:      Musculoskeletal: Neck supple.   Pulmonary:      Effort: Pulmonary effort is normal.   Neurological:      Mental Status: He is alert and oriented to person, place, and time.      GCS: GCS eye subscore is 4. GCS verbal subscore is 5. GCS motor subscore is 6.      Cranial Nerves: No cranial nerve deficit.      Deep Tendon Reflexes:      Reflex Scores:       Patellar reflexes are 3+ on the right side and 3+ on the left side.       Achilles reflexes are 3+ on the right side and 3+ on the left side.     Comments: He exhibits weakness with hip flexion in the BLE and difficulty standing on his toes and heels.             IMAGING:  MRI Lumbar spine (outside disc) shows facet arthropathy at L3-4 with significant narrowing. There is some degenerative disc disease at L5-S1.     I have personally reviewed the images with the pt.      I, Dr. Silas Castillo, personally performed the services described in this documentation. All medical record entries made by the scribe, Eze Holley, were at my direction and in my presence.  I have reviewed the chart and agree that the record reflects my personal performance and is accurate and complete. Silas Castillo MD. 08/17/2020    Assessment:       1. Spinal stenosis of lumbar region without neurogenic claudication    2.      Cervical spondylosis      Plan:   I have personally reviewed the MRI lumbar spine with the pt which shows facet arthropathy at L3-4 with significant narrowing. There is some degenerative disc disease at L5-S1.     Will refer the pt to physical therapy to improve his strength and flexibility and refer to pain management  for an MICHAEL L3-4.    I will schedule a follow up visit in 2 months.

## 2020-08-17 NOTE — PATIENT INSTRUCTIONS
I have personally reviewed the MRI lumbar spine with the pt which shows facet arthropathy at L3-4 with significant narrowing. There is some degenerative disc disease at L5-S1.     Will refer the pt to physical therapy to improve his strength and flexibility and refer to pain management for an MICHAEL L3-4.    I will schedule a follow up visit in 2 months.

## 2020-08-18 ENCOUNTER — TELEPHONE (OUTPATIENT)
Dept: NEUROSURGERY | Facility: CLINIC | Age: 60
End: 2020-08-18

## 2020-08-18 DIAGNOSIS — M48.061 SPINAL STENOSIS OF LUMBAR REGION WITHOUT NEUROGENIC CLAUDICATION: Primary | ICD-10-CM

## 2020-08-19 ENCOUNTER — TELEPHONE (OUTPATIENT)
Dept: PAIN MEDICINE | Facility: CLINIC | Age: 60
End: 2020-08-19

## 2020-08-20 ENCOUNTER — TELEPHONE (OUTPATIENT)
Dept: PAIN MEDICINE | Facility: CLINIC | Age: 60
End: 2020-08-20

## 2020-08-21 ENCOUNTER — TELEPHONE (OUTPATIENT)
Dept: PAIN MEDICINE | Facility: CLINIC | Age: 60
End: 2020-08-21

## 2020-08-21 DIAGNOSIS — M48.061 SPINAL STENOSIS OF LUMBAR REGION WITHOUT NEUROGENIC CLAUDICATION: Primary | ICD-10-CM

## 2020-08-31 ENCOUNTER — TELEPHONE (OUTPATIENT)
Dept: NEUROSURGERY | Facility: CLINIC | Age: 60
End: 2020-08-31

## 2020-08-31 NOTE — TELEPHONE ENCOUNTER
Returned pt's call. He is very leary of having the MICHAEL because he has had several surgeris in the area and had a horrible experience with a spinal tap in that area.    Discussed the process of MICHAEL and the rationale. Explained that Dr Castillo is generally intent on pts'd following the POC he establishes with them but that no one can make him do something he does not want to do.    He v/u and thanks.      ----- Message from Anisha Bautista sent at 8/31/2020 10:09 AM CDT -----  Contact: self @ [phone]  Pt would like tos peak with Lex[facility] concerning the procedure his is scheduled for with Dr Leach on 9-3-20.

## 2020-09-02 ENCOUNTER — LAB VISIT (OUTPATIENT)
Dept: LAB | Facility: HOSPITAL | Age: 60
End: 2020-09-02
Attending: INTERNAL MEDICINE
Payer: MEDICARE

## 2020-09-02 DIAGNOSIS — I25.10 ATHEROSCLEROSIS OF NATIVE CORONARY ARTERY OF NATIVE HEART WITHOUT ANGINA PECTORIS: ICD-10-CM

## 2020-09-02 DIAGNOSIS — E55.9 VITAMIN D DEFICIENCY: ICD-10-CM

## 2020-09-02 LAB
25(OH)D3+25(OH)D2 SERPL-MCNC: 34 NG/ML (ref 30–96)
ALBUMIN SERPL BCP-MCNC: 4.1 G/DL (ref 3.5–5.2)
ALP SERPL-CCNC: 49 U/L (ref 55–135)
ALT SERPL W/O P-5'-P-CCNC: 31 U/L (ref 10–44)
ANION GAP SERPL CALC-SCNC: 9 MMOL/L (ref 8–16)
AST SERPL-CCNC: 32 U/L (ref 10–40)
BILIRUB SERPL-MCNC: 0.6 MG/DL (ref 0.1–1)
BUN SERPL-MCNC: 15 MG/DL (ref 6–20)
CALCIUM SERPL-MCNC: 9.3 MG/DL (ref 8.7–10.5)
CHLORIDE SERPL-SCNC: 105 MMOL/L (ref 95–110)
CO2 SERPL-SCNC: 25 MMOL/L (ref 23–29)
CREAT SERPL-MCNC: 0.9 MG/DL (ref 0.5–1.4)
EST. GFR  (AFRICAN AMERICAN): >60 ML/MIN/1.73 M^2
EST. GFR  (NON AFRICAN AMERICAN): >60 ML/MIN/1.73 M^2
GLUCOSE SERPL-MCNC: 88 MG/DL (ref 70–110)
POTASSIUM SERPL-SCNC: 4.3 MMOL/L (ref 3.5–5.1)
PROT SERPL-MCNC: 7.4 G/DL (ref 6–8.4)
SODIUM SERPL-SCNC: 139 MMOL/L (ref 136–145)

## 2020-09-02 PROCEDURE — 82306 VITAMIN D 25 HYDROXY: CPT

## 2020-09-02 PROCEDURE — 80053 COMPREHEN METABOLIC PANEL: CPT

## 2020-09-02 PROCEDURE — 36415 COLL VENOUS BLD VENIPUNCTURE: CPT | Mod: PO

## 2020-09-03 ENCOUNTER — HOSPITAL ENCOUNTER (OUTPATIENT)
Facility: OTHER | Age: 60
Discharge: HOME | End: 2020-09-03
Attending: ANESTHESIOLOGY | Admitting: ANESTHESIOLOGY
Payer: MEDICARE

## 2020-09-03 VITALS
RESPIRATION RATE: 18 BRPM | WEIGHT: 196 LBS | TEMPERATURE: 98 F | BODY MASS INDEX: 28.06 KG/M2 | SYSTOLIC BLOOD PRESSURE: 147 MMHG | OXYGEN SATURATION: 98 % | DIASTOLIC BLOOD PRESSURE: 92 MMHG | HEIGHT: 70 IN | HEART RATE: 59 BPM

## 2020-09-03 DIAGNOSIS — G89.29 CHRONIC PAIN: ICD-10-CM

## 2020-09-03 DIAGNOSIS — M54.17 LUMBOSACRAL RADICULOPATHY: ICD-10-CM

## 2020-09-03 DIAGNOSIS — M51.37 DDD (DEGENERATIVE DISC DISEASE), LUMBOSACRAL: Primary | ICD-10-CM

## 2020-09-03 PROCEDURE — 63600175 PHARM REV CODE 636 W HCPCS: Performed by: ANESTHESIOLOGY

## 2020-09-03 PROCEDURE — 62323 NJX INTERLAMINAR LMBR/SAC: CPT | Mod: ,,, | Performed by: ANESTHESIOLOGY

## 2020-09-03 PROCEDURE — 62323 NJX INTERLAMINAR LMBR/SAC: CPT | Performed by: ANESTHESIOLOGY

## 2020-09-03 PROCEDURE — A4216 STERILE WATER/SALINE, 10 ML: HCPCS | Performed by: ANESTHESIOLOGY

## 2020-09-03 PROCEDURE — 62323 PR INJ LUMBAR/SACRAL, W/IMAGING GUIDANCE: ICD-10-PCS | Mod: ,,, | Performed by: ANESTHESIOLOGY

## 2020-09-03 PROCEDURE — 25000003 PHARM REV CODE 250: Performed by: ANESTHESIOLOGY

## 2020-09-03 PROCEDURE — 25500020 PHARM REV CODE 255: Performed by: ANESTHESIOLOGY

## 2020-09-03 RX ORDER — DEXAMETHASONE SODIUM PHOSPHATE 100 MG/10ML
INJECTION INTRAMUSCULAR; INTRAVENOUS
Status: DISCONTINUED | OUTPATIENT
Start: 2020-09-03 | End: 2020-09-03 | Stop reason: HOSPADM

## 2020-09-03 RX ORDER — SODIUM CHLORIDE 9 MG/ML
500 INJECTION, SOLUTION INTRAVENOUS CONTINUOUS
Status: DISCONTINUED | OUTPATIENT
Start: 2020-09-03 | End: 2020-09-03 | Stop reason: HOSPADM

## 2020-09-03 RX ORDER — LIDOCAINE HYDROCHLORIDE 10 MG/ML
INJECTION, SOLUTION EPIDURAL; INFILTRATION; INTRACAUDAL; PERINEURAL
Status: DISCONTINUED | OUTPATIENT
Start: 2020-09-03 | End: 2020-09-03 | Stop reason: HOSPADM

## 2020-09-03 RX ORDER — LIDOCAINE HYDROCHLORIDE 10 MG/ML
INJECTION INFILTRATION; PERINEURAL
Status: DISCONTINUED | OUTPATIENT
Start: 2020-09-03 | End: 2020-09-03 | Stop reason: HOSPADM

## 2020-09-03 RX ORDER — SODIUM CHLORIDE 9 MG/ML
INJECTION, SOLUTION INTRAMUSCULAR; INTRAVENOUS; SUBCUTANEOUS
Status: DISCONTINUED | OUTPATIENT
Start: 2020-09-03 | End: 2020-09-03 | Stop reason: HOSPADM

## 2020-09-03 NOTE — DISCHARGE INSTRUCTIONS

## 2020-09-03 NOTE — OP NOTE
Lumbar Interlaminar Epidural Steroid Injection under Fluoroscopic Guidance.  Time-out taken to identify patient and procedure side prior to starting the procedure.   I attest that I have reviewed the patient's home medications prior to the procedure and no contraindication have been identified. I  re-evaluated the patient after the patient was positioned for the procedure in the procedure room immediately before the procedural time-out. The vital signs are current and represent the current state of the patient which has not significantly changed since the preprocedure assessment.                                                               Date of Service: 09/03/2020    PCP: Danilo Weinstein MD    Referring Physician:    PROCEDURE:  L3/4 Interlaminar epidural steroid injection under fluoroscopic guidance.    REASONS FOR PROCEDURE: Spinal stenosis of lumbar region without neurogenic claudication [M48.061]   1. DDD (degenerative disc disease), lumbosacral    2. Lumbosacral radiculopathy    3. Chronic pain      POSTOP DIAGNOSIS:  Spinal stenosis of lumbar region without neurogenic claudication [M48.061]     1. DDD (degenerative disc disease), lumbosacral    2. Lumbosacral radiculopathy    3. Chronic pain      PHYSICIAN: Muriel Leach MD  ASSISTANTS: none    MEDICATIONS INJECTED: Preservative-free dexamethasone 10mg with 4mL of sterile Xylocaine-MPF 1% and 1ml of sterile preservative-free normal saline.    LOCAL ANESTHETIC INJECTED:    Xylocaine 1% 9ml with Sodium Bicarbonate 1ml.   SEDATION MEDICATIONS: none    ESTIMATED BLOOD LOSS:  none.    COMPLICATIONS:  none.    TECHNIQUE:  With the patient laying in a prone position, the area was prepped and draped in the usual sterile fashion using ChloraPrep and a fenestrated drape.  Local anesthetic was given using a 27-gauge needle by raising a wheal and going down to the hub of the needle.  A 3.5 inch 20-gauge Touhy needle was introduced under fluoroscopic guidance.  It met  the lamina of the posterior element. The needle was then hinged above the lamina.  Loss of resistance technique was employed while advancing the needle.  Once in the desired position, contrast dye Omnipaque was injected to confirm placement and there was no vascular runoff.  Digital subtraction was employed to confirm that there was no vascular runoff.  The medication was then injected slowly.  The patient tolerated the procedure well.      PAIN BEFORE THE PROCEDURE: 5-9/10    PAIN AFTER THE PROCEDURE: 0/10    The patient was monitored after the procedure.   They were given post-procedure and discharge instructions to follow at home.  The patient was discharged in a stable condition.

## 2020-09-03 NOTE — DISCHARGE SUMMARY
Discharge Note  Short Stay      SUMMARY     Admit Date: 9/3/2020    Attending Physician: Muriel Leach      Discharge Physician: Muriel Leach      Discharge Date: 9/3/2020 12:28 PM    Procedure(s) (LRB):  LUMBAR L3/4 MICHAEL DIRECT REFERRAL (N/A)    Final Diagnosis: Spinal stenosis of lumbar region without neurogenic claudication [M48.061]    Disposition: Home or self care    Patient Instructions:   Current Discharge Medication List      CONTINUE these medications which have NOT CHANGED    Details   aspirin (ECOTRIN) 81 MG EC tablet Take 1 tablet (81 mg total) by mouth once daily.  Refills: 0    Comments: HOLD ASPIRIN X 1 WEEK AFTER SURGERY      baclofen (LIORESAL) 10 MG tablet Take 10 mg by mouth 3 (three) times daily.       carvedilol (COREG) 12.5 MG tablet TAKE ONE TABLET BY MOUTH TWICE DAILY WITH MEALS  Qty: 180 tablet, Refills: 3    Comments: This prescription was filled on 7/26/2019. Any refills authorized will be placed on file.      cholecalciferol, vitamin D3, (VITAMIN D3) 1,000 unit capsule Take 1 capsule (1,000 Units total) by mouth once daily.  Qty: 90 capsule, Refills: 3    Associated Diagnoses: Low vitamin D level      cyanocobalamin, vitamin B-12, (VITAMIN B-12) 500 mcg TbSR Take by mouth.      fish oil-omega-3 fatty acids 300-1,000 mg capsule Take 2 g by mouth once daily.      LYRICA 75 mg capsule 75 mg 4 (four) times daily.       multivitamin capsule Take 1 capsule by mouth once daily.       rosuvastatin (CRESTOR) 20 MG tablet TAKE ONE TABLET BY MOUTH EVERY EVENING  Qty: 90 tablet, Refills: 3    Comments: This prescription was filled on 7/26/2019. Any refills authorized will be placed on file.      UBIQUINOL, BULK, MISC 100 mg by Misc.(Non-Drug; Combo Route) route once daily.                  Discharge Diagnosis: Spinal stenosis of lumbar region without neurogenic claudication [M48.061]  Condition on Discharge: Stable with no complications to procedure   Diet on Discharge: Same as before.  Activity: as per  instruction sheet.  Discharge to: Home with a responsible adult.  Follow up: 2-4 weeks       Please call my office or pager at [phone] if experienced any weakness or loss of sensation, fever > 101.5, pain uncontrolled with oral medications, persistent nausea/vomiting/or diarrhea, redness or drainage from the incisions, or any other worrisome concerns. If physician on call was not reached or could not communicate with our office for any reason please go to the nearest emergency department

## 2020-09-03 NOTE — DISCHARGE SUMMARY
Discharge Note  Short Stay      SUMMARY     Admit Date: 9/3/2020    Attending Physician: Muriel Leach      Discharge Physician: Muriel Leach      Discharge Date: 9/3/2020 12:23 PM    Procedure(s) (LRB):  LUMBAR L3/4 MICHAEL DIRECT REFERRAL (N/A)    Final Diagnosis: Spinal stenosis of lumbar region without neurogenic claudication [M48.061]    Disposition: Home or self care    Patient Instructions:   Current Discharge Medication List      CONTINUE these medications which have NOT CHANGED    Details   aspirin (ECOTRIN) 81 MG EC tablet Take 1 tablet (81 mg total) by mouth once daily.  Refills: 0    Comments: HOLD ASPIRIN X 1 WEEK AFTER SURGERY      baclofen (LIORESAL) 10 MG tablet Take 10 mg by mouth 3 (three) times daily.       carvedilol (COREG) 12.5 MG tablet TAKE ONE TABLET BY MOUTH TWICE DAILY WITH MEALS  Qty: 180 tablet, Refills: 3    Comments: This prescription was filled on 7/26/2019. Any refills authorized will be placed on file.      cholecalciferol, vitamin D3, (VITAMIN D3) 1,000 unit capsule Take 1 capsule (1,000 Units total) by mouth once daily.  Qty: 90 capsule, Refills: 3    Associated Diagnoses: Low vitamin D level      cyanocobalamin, vitamin B-12, (VITAMIN B-12) 500 mcg TbSR Take by mouth.      fish oil-omega-3 fatty acids 300-1,000 mg capsule Take 2 g by mouth once daily.      LYRICA 75 mg capsule 75 mg 4 (four) times daily.       multivitamin capsule Take 1 capsule by mouth once daily.       rosuvastatin (CRESTOR) 20 MG tablet TAKE ONE TABLET BY MOUTH EVERY EVENING  Qty: 90 tablet, Refills: 3    Comments: This prescription was filled on 7/26/2019. Any refills authorized will be placed on file.      UBIQUINOL, BULK, MISC 100 mg by Misc.(Non-Drug; Combo Route) route once daily.                  Discharge Diagnosis: Spinal stenosis of lumbar region without neurogenic claudication [M48.061]  Condition on Discharge: Stable with no complications to procedure   Diet on Discharge: Same as before.  Activity: as per  instruction sheet.  Discharge to: Home with a responsible adult.  Follow up: 2-4 weeks       Please call my office or pager at [phone] if experienced any weakness or loss of sensation, fever > 101.5, pain uncontrolled with oral medications, persistent nausea/vomiting/or diarrhea, redness or drainage from the incisions, or any other worrisome concerns. If physician on call was not reached or could not communicate with our office for any reason please go to the nearest emergency department

## 2020-09-08 ENCOUNTER — PATIENT MESSAGE (OUTPATIENT)
Dept: CARDIOLOGY | Facility: CLINIC | Age: 60
End: 2020-09-08

## 2020-09-16 ENCOUNTER — NURSE TRIAGE (OUTPATIENT)
Dept: ADMINISTRATIVE | Facility: CLINIC | Age: 60
End: 2020-09-16

## 2020-09-18 ENCOUNTER — OFFICE VISIT (OUTPATIENT)
Dept: CARDIOLOGY | Facility: CLINIC | Age: 60
End: 2020-09-18
Payer: MEDICARE

## 2020-09-18 VITALS
DIASTOLIC BLOOD PRESSURE: 94 MMHG | HEIGHT: 70 IN | BODY MASS INDEX: 28.28 KG/M2 | SYSTOLIC BLOOD PRESSURE: 150 MMHG | WEIGHT: 197.56 LBS | HEART RATE: 64 BPM

## 2020-09-18 DIAGNOSIS — I25.10 ATHEROSCLEROSIS OF NATIVE CORONARY ARTERY OF NATIVE HEART WITHOUT ANGINA PECTORIS: Primary | ICD-10-CM

## 2020-09-18 DIAGNOSIS — Z98.61 S/P PTCA (PERCUTANEOUS TRANSLUMINAL CORONARY ANGIOPLASTY): ICD-10-CM

## 2020-09-18 DIAGNOSIS — I25.2 OLD MI (MYOCARDIAL INFARCTION): ICD-10-CM

## 2020-09-18 DIAGNOSIS — E78.00 PURE HYPERCHOLESTEROLEMIA: ICD-10-CM

## 2020-09-18 DIAGNOSIS — I25.5 CARDIOMYOPATHY, ISCHEMIC: ICD-10-CM

## 2020-09-18 PROCEDURE — 99999 PR PBB SHADOW E&M-EST. PATIENT-LVL IV: CPT | Mod: PBBFAC,,, | Performed by: INTERNAL MEDICINE

## 2020-09-18 PROCEDURE — 99213 PR OFFICE/OUTPT VISIT, EST, LEVL III, 20-29 MIN: ICD-10-PCS | Mod: S$PBB,,, | Performed by: INTERNAL MEDICINE

## 2020-09-18 PROCEDURE — 99999 PR PBB SHADOW E&M-EST. PATIENT-LVL IV: ICD-10-PCS | Mod: PBBFAC,,, | Performed by: INTERNAL MEDICINE

## 2020-09-18 PROCEDURE — 99213 OFFICE O/P EST LOW 20 MIN: CPT | Mod: S$PBB,,, | Performed by: INTERNAL MEDICINE

## 2020-09-18 PROCEDURE — 99214 OFFICE O/P EST MOD 30 MIN: CPT | Mod: PBBFAC | Performed by: INTERNAL MEDICINE

## 2020-09-18 RX ORDER — SILDENAFIL 100 MG/1
100 TABLET, FILM COATED ORAL DAILY PRN
Qty: 30 TABLET | Refills: 3 | Status: SHIPPED | OUTPATIENT
Start: 2020-09-18 | End: 2021-09-18

## 2020-09-18 NOTE — PROGRESS NOTES
Subjective:    Patient ID:  Ramone Nguyen is a 59 y.o. male who presents for follow-up of Cardiomyopathy (11 month f/u )      HPI  Mr. Nguyen has no symptoms to suggest exertional angina.. He has no    symptoms of chf. He has no symptoms of dysrhythmia. He has never had    syncope.       He did have an episode of chest discomfort described as an elephant sitting on his chest in March 2016.  He was evaluated at his local hospital in [place], [place].  There was no heart attack and apparently no evidence of new coronary events.  These symptoms have not recurred and he and his wife believe they're likely due to stress that he was having at the time.     He has had cervical spine surgery and has some residual problems in both legs.  Doing PT in [place]    As noted previously, he notices a purplish discoloration to his right foot when he is sitting.  He has no claudication.       He developed angioedema in several locations with ace-i, and I am   reluctant to try an ARB.      He developed acute anterior MI in [facility] 1997 with delay    getting to [facility] at which point LAD occluded and ptca performed. Went on    to develop large anterior scar. Was a  and cigarette smoker at    the time, both of which have been discontinued.  On Workmen's Comp b/o NOFD     CHRONIC CONDITIONS:    - Old myocardial infarction    - [facility] ISCHEMIC HRT DIS NEC - with decision made by EP not to place AICD    Contraindication to ace-i and ARB    - Other and unspecified hyperlipidemia - not at goal but has had    problems symptomatically with higher dose statin and wants to try better    diet, wt loss, etc.    - Percutaneous transluminal coronary angioplasty status    - Coronary atherosclerosis of native coronary vessel     Current Outpatient Medications   Medication Sig Dispense Refill    aspirin (ECOTRIN) 81 MG EC tablet Take 1 tablet (81 mg total) by mouth once daily.  0    baclofen (LIORESAL) 10 MG tablet Take 10  "mg by mouth 3 (three) times daily.       carvediloL (COREG) 12.5 MG tablet Take 1 tablet (12.5 mg total) by mouth 2 (two) times daily with meals. 180 tablet 3    cholecalciferol, vitamin D3, (VITAMIN D3) 1,000 unit capsule Take 1 capsule (1,000 Units total) by mouth once daily. 90 capsule 3    cyanocobalamin, vitamin B-12, (VITAMIN B-12) 500 mcg TbSR Take by mouth.      fish oil-omega-3 fatty acids 300-1,000 mg capsule Take 2 g by mouth once daily.      LYRICA 75 mg capsule 75 mg 4 (four) times daily.       multivitamin capsule Take 1 capsule by mouth once daily.       rosuvastatin (CRESTOR) 20 MG tablet TAKE ONE TABLET BY MOUTH EVERY EVENING 90 tablet 3    UBIQUINOL, BULK, MISC 100 mg by Misc.(Non-Drug; Combo Route) route once daily.        No current facility-administered medications for this visit.        Review of Systems   Constitution: Negative for malaise/fatigue, night sweats, weight gain and weight loss.   Eyes: Negative for blurred vision and visual disturbance.   Cardiovascular: Negative for chest pain and palpitations.   Respiratory: Negative for cough and shortness of breath.    Hematologic/Lymphatic: Does not bruise/bleed easily.   Skin: Negative for rash.   Musculoskeletal: Positive for back pain and neck pain. Negative for joint swelling.   Gastrointestinal: Negative for abdominal pain, change in bowel habit, nausea and vomiting.   Neurological: Positive for numbness and paresthesias. Negative for headaches.        Objective:    Physical Exam   Constitutional:   BP (!) 150/94 (BP Location: Left arm, Patient Position: Sitting, BP Method: Medium (Automatic))   Pulse 64   Ht 5' 10" (1.778 m)   Wt 89.6 kg (197 lb 8.5 oz)   BMI 28.34 kg/m²      Neck: No JVD present.   Cardiovascular: Normal rate, regular rhythm, normal heart sounds and intact distal pulses. Exam reveals no gallop.   No murmur heard.  Pulmonary/Chest: Breath sounds normal. He has no rales.   Abdominal: Soft. Bowel sounds are " normal.   Musculoskeletal:         General: No edema.         Assessment:       1. Atherosclerosis of native coronary artery of native heart without angina pectoris    2. Cardiomyopathy, ischemic    3. Old MI (myocardial infarction)    4. Pure hypercholesterolemia    5. S/P PTCA (percutaneous transluminal coronary angioplasty)         Plan:       Continue same meds  See in 6 months with labs

## 2020-09-22 RX ORDER — ROSUVASTATIN CALCIUM 20 MG/1
TABLET, COATED ORAL
Qty: 90 TABLET | Refills: 3 | Status: SHIPPED | OUTPATIENT
Start: 2020-09-22 | End: 2021-09-21 | Stop reason: SDUPTHER

## 2020-10-21 ENCOUNTER — OFFICE VISIT (OUTPATIENT)
Dept: NEUROSURGERY | Facility: CLINIC | Age: 60
End: 2020-10-21
Payer: MEDICARE

## 2020-10-21 VITALS
BODY MASS INDEX: 28.47 KG/M2 | TEMPERATURE: 98 F | SYSTOLIC BLOOD PRESSURE: 125 MMHG | HEART RATE: 59 BPM | DIASTOLIC BLOOD PRESSURE: 80 MMHG | WEIGHT: 198.44 LBS

## 2020-10-21 DIAGNOSIS — G95.9 CERVICAL MYELOPATHY: ICD-10-CM

## 2020-10-21 DIAGNOSIS — M48.061 SPINAL STENOSIS OF LUMBAR REGION WITHOUT NEUROGENIC CLAUDICATION: Primary | ICD-10-CM

## 2020-10-21 DIAGNOSIS — M47.816 LUMBAR SPONDYLOSIS: ICD-10-CM

## 2020-10-21 PROCEDURE — 99999 PR PBB SHADOW E&M-EST. PATIENT-LVL III: ICD-10-PCS | Mod: PBBFAC,,, | Performed by: NEUROLOGICAL SURGERY

## 2020-10-21 PROCEDURE — 99999 PR PBB SHADOW E&M-EST. PATIENT-LVL III: CPT | Mod: PBBFAC,,, | Performed by: NEUROLOGICAL SURGERY

## 2020-10-21 PROCEDURE — 99214 OFFICE O/P EST MOD 30 MIN: CPT | Mod: S$PBB,,, | Performed by: NEUROLOGICAL SURGERY

## 2020-10-21 PROCEDURE — 99214 PR OFFICE/OUTPT VISIT, EST, LEVL IV, 30-39 MIN: ICD-10-PCS | Mod: S$PBB,,, | Performed by: NEUROLOGICAL SURGERY

## 2020-10-21 PROCEDURE — 99213 OFFICE O/P EST LOW 20 MIN: CPT | Mod: PBBFAC | Performed by: NEUROLOGICAL SURGERY

## 2020-10-21 NOTE — PATIENT INSTRUCTIONS
Pt advised to continue to exercise on a daily basis. He will follow up with me as needed and was advised to contact us with any questions, concerns, or if he experiences any new or worsening symptoms.

## 2020-10-21 NOTE — PROGRESS NOTES
Subjective:   I, Eze Holley, attest that this documentation has been prepared under the direction and in the presence of Silas Castillo MD.     Patient ID: Ramone Nguyen is a 60 y.o. male     Chief Complaint: No chief complaint on file.          HPI  Mr. Ramone Nguyen is a pleasant 60 y.o. gentleman with a hx of a C3-4 and C6-7 ACDF on 11/11/2013, Left-sided L3-4 laminectomy for decompression on 04/20/2015, C4-5 ACDF on 05/16/2016, and left-sided L3-4 laminectomy and medial facetectomy with decompression of nerve roots and resection of synovial cyst on 11/14/2016. Pt was recently seen in clinic on 08/17/2020 due to left buttock pain that radiates into his LLE, terminating at the knee, and involving two of his toes. He also endorsed left-sided weakness and has chronic weakness in the RLE that has remained stable.     He presents today for his 2 month follow up s/p PT and L3/4 MICHAEL which was performed on 09/03/2020. Pt states the thigh pain has since subsided, despite continued intermittent shooting pain down the lateral aspect of his LLE. He states he had to temporarily discontinue physical therapy but plans on resuming soon.       Review of Systems   Constitutional: Negative for activity change, appetite change, fatigue, fever and unexpected weight change.   HENT: Negative for facial swelling.    Eyes: Negative.    Respiratory: Negative.    Cardiovascular: Negative.    Gastrointestinal: Negative for diarrhea, nausea and vomiting.   Endocrine: Negative.    Genitourinary: Negative.    Musculoskeletal: Negative for back pain, joint swelling, myalgias and neck pain.        + intermittent pain in LLE   Neurological: Negative for dizziness, seizures, weakness, numbness and headaches.   Psychiatric/Behavioral: Negative.       Past Medical History:   Diagnosis Date    Coronary artery disease     Hyperlipidemia     Hypertension     MI (myocardial infarction)        Objective:      Vitals:    10/21/20 1120    BP: 125/80   Pulse: (!) 59   Temp: 97.5 °F (36.4 °C)      Physical Exam  HENT:      Head: Normocephalic and atraumatic.   Neck:      Musculoskeletal: Neck supple.   Neurological:      Mental Status: He is alert and oriented to person, place, and time.      GCS: GCS eye subscore is 4. GCS verbal subscore is 5. GCS motor subscore is 6.      Cranial Nerves: Cranial nerves are intact. No cranial nerve deficit.      Sensory: Sensation is intact.      Motor: Motor function is intact.            I, Dr. Silas Castillo, personally performed the services described in this documentation. All medical record entries made by the scribe, Eze Holley, were at my direction and in my presence.  I have reviewed the chart and agree that the record reflects my personal performance and is accurate and complete. Silas Castillo MD. 10/21/2020    Assessment:       Cervical spondylosis.  Lumbar spondylosis.     Plan:     Pt advised to continue to exercise on a daily basis. He will follow up with me as needed and was advised to contact us with any questions, concerns, or if he experiences any new or worsening symptoms.

## 2021-02-12 ENCOUNTER — PATIENT MESSAGE (OUTPATIENT)
Dept: CARDIOLOGY | Facility: CLINIC | Age: 61
End: 2021-02-12

## 2021-02-17 ENCOUNTER — LAB VISIT (OUTPATIENT)
Dept: LAB | Facility: HOSPITAL | Age: 61
End: 2021-02-17
Attending: INTERNAL MEDICINE
Payer: OTHER MISCELLANEOUS

## 2021-02-17 DIAGNOSIS — I25.5 CARDIOMYOPATHY, ISCHEMIC: ICD-10-CM

## 2021-02-17 LAB
ALBUMIN SERPL BCP-MCNC: 4 G/DL (ref 3.5–5.2)
ALP SERPL-CCNC: 45 U/L (ref 55–135)
ALT SERPL W/O P-5'-P-CCNC: 25 U/L (ref 10–44)
ANION GAP SERPL CALC-SCNC: 8 MMOL/L (ref 8–16)
AST SERPL-CCNC: 30 U/L (ref 10–40)
BILIRUB SERPL-MCNC: 0.5 MG/DL (ref 0.1–1)
BUN SERPL-MCNC: 11 MG/DL (ref 6–20)
CALCIUM SERPL-MCNC: 9 MG/DL (ref 8.7–10.5)
CHLORIDE SERPL-SCNC: 104 MMOL/L (ref 95–110)
CHOLEST SERPL-MCNC: 141 MG/DL (ref 120–199)
CHOLEST/HDLC SERPL: 3 {RATIO} (ref 2–5)
CO2 SERPL-SCNC: 27 MMOL/L (ref 23–29)
CREAT SERPL-MCNC: 1 MG/DL (ref 0.5–1.4)
EST. GFR  (AFRICAN AMERICAN): >60 ML/MIN/1.73 M^2
EST. GFR  (NON AFRICAN AMERICAN): >60 ML/MIN/1.73 M^2
GLUCOSE SERPL-MCNC: 91 MG/DL (ref 70–110)
HDLC SERPL-MCNC: 47 MG/DL (ref 40–75)
HDLC SERPL: 33.3 % (ref 20–50)
LDLC SERPL CALC-MCNC: 64.2 MG/DL (ref 63–159)
NONHDLC SERPL-MCNC: 94 MG/DL
POTASSIUM SERPL-SCNC: 4.4 MMOL/L (ref 3.5–5.1)
PROT SERPL-MCNC: 7.2 G/DL (ref 6–8.4)
SODIUM SERPL-SCNC: 139 MMOL/L (ref 136–145)
TRIGL SERPL-MCNC: 149 MG/DL (ref 30–150)

## 2021-02-17 PROCEDURE — 80053 COMPREHEN METABOLIC PANEL: CPT

## 2021-02-17 PROCEDURE — 36415 COLL VENOUS BLD VENIPUNCTURE: CPT | Mod: PO

## 2021-02-17 PROCEDURE — 80061 LIPID PANEL: CPT

## 2021-02-23 ENCOUNTER — OFFICE VISIT (OUTPATIENT)
Dept: CARDIOLOGY | Facility: CLINIC | Age: 61
End: 2021-02-23
Payer: OTHER MISCELLANEOUS

## 2021-02-23 VITALS
SYSTOLIC BLOOD PRESSURE: 149 MMHG | BODY MASS INDEX: 29.42 KG/M2 | HEIGHT: 70 IN | DIASTOLIC BLOOD PRESSURE: 79 MMHG | HEART RATE: 62 BPM | WEIGHT: 205.5 LBS

## 2021-02-23 DIAGNOSIS — I25.2 OLD MI (MYOCARDIAL INFARCTION): ICD-10-CM

## 2021-02-23 DIAGNOSIS — I25.5 CARDIOMYOPATHY, ISCHEMIC: ICD-10-CM

## 2021-02-23 DIAGNOSIS — E78.00 PURE HYPERCHOLESTEROLEMIA: ICD-10-CM

## 2021-02-23 DIAGNOSIS — I25.10 ATHEROSCLEROSIS OF NATIVE CORONARY ARTERY OF NATIVE HEART WITHOUT ANGINA PECTORIS: Primary | ICD-10-CM

## 2021-02-23 DIAGNOSIS — Z98.61 S/P PTCA (PERCUTANEOUS TRANSLUMINAL CORONARY ANGIOPLASTY): ICD-10-CM

## 2021-02-23 DIAGNOSIS — I10 ESSENTIAL HYPERTENSION: ICD-10-CM

## 2021-02-23 PROCEDURE — 99213 PR OFFICE/OUTPT VISIT, EST, LEVL III, 20-29 MIN: ICD-10-PCS | Mod: S$GLB,,, | Performed by: INTERNAL MEDICINE

## 2021-02-23 PROCEDURE — 99999 PR PBB SHADOW E&M-EST. PATIENT-LVL IV: ICD-10-PCS | Mod: PBBFAC,,, | Performed by: INTERNAL MEDICINE

## 2021-02-23 PROCEDURE — 99213 OFFICE O/P EST LOW 20 MIN: CPT | Mod: S$GLB,,, | Performed by: INTERNAL MEDICINE

## 2021-02-23 PROCEDURE — 99214 OFFICE O/P EST MOD 30 MIN: CPT | Mod: PBBFAC | Performed by: INTERNAL MEDICINE

## 2021-02-23 PROCEDURE — 99999 PR PBB SHADOW E&M-EST. PATIENT-LVL IV: CPT | Mod: PBBFAC,,, | Performed by: INTERNAL MEDICINE

## 2021-02-23 RX ORDER — AMLODIPINE BESYLATE 5 MG/1
5 TABLET ORAL DAILY
Qty: 90 TABLET | Refills: 3 | Status: SHIPPED | OUTPATIENT
Start: 2021-02-23 | End: 2021-07-27 | Stop reason: SINTOL

## 2021-03-25 ENCOUNTER — OFFICE VISIT (OUTPATIENT)
Dept: FAMILY MEDICINE | Facility: CLINIC | Age: 61
End: 2021-03-25
Payer: MEDICARE

## 2021-03-25 VITALS
DIASTOLIC BLOOD PRESSURE: 80 MMHG | WEIGHT: 202.38 LBS | HEART RATE: 73 BPM | TEMPERATURE: 97 F | RESPIRATION RATE: 16 BRPM | OXYGEN SATURATION: 98 % | SYSTOLIC BLOOD PRESSURE: 126 MMHG | BODY MASS INDEX: 28.97 KG/M2 | HEIGHT: 70 IN

## 2021-03-25 DIAGNOSIS — I10 ESSENTIAL HYPERTENSION: ICD-10-CM

## 2021-03-25 DIAGNOSIS — Z12.11 COLON CANCER SCREENING: ICD-10-CM

## 2021-03-25 DIAGNOSIS — Z12.5 ENCOUNTER FOR PROSTATE CANCER SCREENING: ICD-10-CM

## 2021-03-25 DIAGNOSIS — I25.10 ATHEROSCLEROSIS OF NATIVE CORONARY ARTERY OF NATIVE HEART WITHOUT ANGINA PECTORIS: ICD-10-CM

## 2021-03-25 DIAGNOSIS — N52.9 ERECTILE DYSFUNCTION, UNSPECIFIED ERECTILE DYSFUNCTION TYPE: ICD-10-CM

## 2021-03-25 DIAGNOSIS — G62.9 NEUROPATHY: ICD-10-CM

## 2021-03-25 DIAGNOSIS — Z00.00 HEALTHCARE MAINTENANCE: Primary | ICD-10-CM

## 2021-03-25 DIAGNOSIS — G89.29 OTHER CHRONIC PAIN: ICD-10-CM

## 2021-03-25 DIAGNOSIS — M1A.9XX0 CHRONIC GOUT INVOLVING TOE OF LEFT FOOT WITHOUT TOPHUS, UNSPECIFIED CAUSE: ICD-10-CM

## 2021-03-25 DIAGNOSIS — I25.5 CARDIOMYOPATHY, ISCHEMIC: ICD-10-CM

## 2021-03-25 DIAGNOSIS — E78.00 PURE HYPERCHOLESTEROLEMIA: ICD-10-CM

## 2021-03-25 PROCEDURE — 99204 OFFICE O/P NEW MOD 45 MIN: CPT | Mod: S$PBB,,, | Performed by: STUDENT IN AN ORGANIZED HEALTH CARE EDUCATION/TRAINING PROGRAM

## 2021-03-25 PROCEDURE — 99999 PR PBB SHADOW E&M-EST. PATIENT-LVL IV: ICD-10-PCS | Mod: PBBFAC,,, | Performed by: STUDENT IN AN ORGANIZED HEALTH CARE EDUCATION/TRAINING PROGRAM

## 2021-03-25 PROCEDURE — 99999 PR PBB SHADOW E&M-EST. PATIENT-LVL IV: CPT | Mod: PBBFAC,,, | Performed by: STUDENT IN AN ORGANIZED HEALTH CARE EDUCATION/TRAINING PROGRAM

## 2021-03-25 PROCEDURE — 99214 OFFICE O/P EST MOD 30 MIN: CPT | Mod: PBBFAC,PO | Performed by: STUDENT IN AN ORGANIZED HEALTH CARE EDUCATION/TRAINING PROGRAM

## 2021-03-25 PROCEDURE — 99204 PR OFFICE/OUTPT VISIT, NEW, LEVL IV, 45-59 MIN: ICD-10-PCS | Mod: S$PBB,,, | Performed by: STUDENT IN AN ORGANIZED HEALTH CARE EDUCATION/TRAINING PROGRAM

## 2021-03-29 ENCOUNTER — PATIENT MESSAGE (OUTPATIENT)
Dept: FAMILY MEDICINE | Facility: CLINIC | Age: 61
End: 2021-03-29

## 2021-03-29 DIAGNOSIS — J30.9 ALLERGIC RHINITIS, UNSPECIFIED SEASONALITY, UNSPECIFIED TRIGGER: ICD-10-CM

## 2021-03-29 DIAGNOSIS — M1A.9XX0 CHRONIC GOUT INVOLVING TOE OF LEFT FOOT WITHOUT TOPHUS, UNSPECIFIED CAUSE: Primary | ICD-10-CM

## 2021-03-29 RX ORDER — INDOMETHACIN 50 MG/1
50 CAPSULE ORAL 3 TIMES DAILY PRN
Qty: 90 CAPSULE | Refills: 2 | Status: SHIPPED | OUTPATIENT
Start: 2021-03-29 | End: 2021-04-28

## 2021-04-05 ENCOUNTER — PATIENT MESSAGE (OUTPATIENT)
Dept: ADMINISTRATIVE | Facility: HOSPITAL | Age: 61
End: 2021-04-05

## 2021-06-25 NOTE — TELEPHONE ENCOUNTER
1325 Post-procedure call to [phone]; denies symptoms of COVID-19 including cough, fever, SOB, Chest pains, or difficulty breathing. Advised to call back or contact his PCP with any onset of symptoms or further questions; Call 911 or go to nearest ER with chest pains or difficulty breathing or with a life threatening emergency; patient verbalizes understanding and agrees to follow advice given.       Reason for Disposition   [1] Follow-up call to recent contact AND [2] information only call, no triage required    Additional Information   Negative: [1] Caller is not with the adult (patient) AND [2] reporting urgent symptoms   Negative: Lab result questions   Negative: Medication questions   Negative: Caller can't be reached by phone   Negative: Caller has already spoken to PCP or another triager   Negative: Requesting regular office appointment   Negative: [1] Caller requesting NON-URGENT health information AND [2] PCP's office is the best resource   Negative: RN needs further essential information from caller in order to complete triage   Negative: Health Information question, no triage required and triager able to answer question   Negative: General information question, no triage required and triager able to answer question   Negative: Question about upcoming scheduled test, no triage required and triager able to answer question   Negative: [1] Caller is not with the adult (patient) AND [2] probable NON-URGENT symptoms    Protocols used: INFORMATION ONLY CALL - NO TRIAGE[facility]       No

## 2021-07-07 ENCOUNTER — PATIENT MESSAGE (OUTPATIENT)
Dept: ADMINISTRATIVE | Facility: HOSPITAL | Age: 61
End: 2021-07-07

## 2021-07-27 ENCOUNTER — OFFICE VISIT (OUTPATIENT)
Dept: CARDIOLOGY | Facility: CLINIC | Age: 61
End: 2021-07-27
Payer: MEDICARE

## 2021-07-27 VITALS
SYSTOLIC BLOOD PRESSURE: 129 MMHG | HEIGHT: 70 IN | WEIGHT: 199.5 LBS | BODY MASS INDEX: 28.56 KG/M2 | DIASTOLIC BLOOD PRESSURE: 89 MMHG | HEART RATE: 73 BPM

## 2021-07-27 DIAGNOSIS — I25.2 OLD MI (MYOCARDIAL INFARCTION): ICD-10-CM

## 2021-07-27 DIAGNOSIS — I10 ESSENTIAL HYPERTENSION: ICD-10-CM

## 2021-07-27 DIAGNOSIS — Z98.61 S/P PTCA (PERCUTANEOUS TRANSLUMINAL CORONARY ANGIOPLASTY): ICD-10-CM

## 2021-07-27 DIAGNOSIS — I25.10 ATHEROSCLEROSIS OF NATIVE CORONARY ARTERY OF NATIVE HEART WITHOUT ANGINA PECTORIS: Primary | ICD-10-CM

## 2021-07-27 DIAGNOSIS — E78.00 PURE HYPERCHOLESTEROLEMIA: ICD-10-CM

## 2021-07-27 DIAGNOSIS — I25.5 CARDIOMYOPATHY, ISCHEMIC: ICD-10-CM

## 2021-07-27 PROCEDURE — 99213 PR OFFICE/OUTPT VISIT, EST, LEVL III, 20-29 MIN: ICD-10-PCS | Mod: S$PBB,,, | Performed by: INTERNAL MEDICINE

## 2021-07-27 PROCEDURE — 99213 OFFICE O/P EST LOW 20 MIN: CPT | Mod: PBBFAC | Performed by: INTERNAL MEDICINE

## 2021-07-27 PROCEDURE — 99999 PR PBB SHADOW E&M-EST. PATIENT-LVL III: CPT | Mod: PBBFAC,,, | Performed by: INTERNAL MEDICINE

## 2021-07-27 PROCEDURE — 99213 OFFICE O/P EST LOW 20 MIN: CPT | Mod: S$PBB,,, | Performed by: INTERNAL MEDICINE

## 2021-07-27 PROCEDURE — 99999 PR PBB SHADOW E&M-EST. PATIENT-LVL III: ICD-10-PCS | Mod: PBBFAC,,, | Performed by: INTERNAL MEDICINE

## 2021-07-27 RX ORDER — CARVEDILOL 12.5 MG/1
12.5 TABLET ORAL 2 TIMES DAILY WITH MEALS
Qty: 180 TABLET | Refills: 3 | Status: SHIPPED | OUTPATIENT
Start: 2021-07-27 | End: 2022-07-20

## 2021-09-21 RX ORDER — ROSUVASTATIN CALCIUM 20 MG/1
20 TABLET, COATED ORAL NIGHTLY
Qty: 90 TABLET | Refills: 3 | Status: SHIPPED | OUTPATIENT
Start: 2021-09-21 | End: 2022-09-15

## 2021-10-07 ENCOUNTER — PATIENT MESSAGE (OUTPATIENT)
Dept: ADMINISTRATIVE | Facility: HOSPITAL | Age: 61
End: 2021-10-07

## 2021-12-08 ENCOUNTER — LAB VISIT (OUTPATIENT)
Dept: LAB | Facility: HOSPITAL | Age: 61
End: 2021-12-08
Attending: STUDENT IN AN ORGANIZED HEALTH CARE EDUCATION/TRAINING PROGRAM
Payer: MEDICARE

## 2021-12-08 DIAGNOSIS — Z12.5 ENCOUNTER FOR PROSTATE CANCER SCREENING: ICD-10-CM

## 2021-12-08 LAB — COMPLEXED PSA SERPL-MCNC: 0.83 NG/ML (ref 0–4)

## 2021-12-08 PROCEDURE — 36415 COLL VENOUS BLD VENIPUNCTURE: CPT | Performed by: STUDENT IN AN ORGANIZED HEALTH CARE EDUCATION/TRAINING PROGRAM

## 2021-12-08 PROCEDURE — 84153 ASSAY OF PSA TOTAL: CPT | Performed by: STUDENT IN AN ORGANIZED HEALTH CARE EDUCATION/TRAINING PROGRAM

## 2022-03-15 DIAGNOSIS — I10 ESSENTIAL HYPERTENSION: ICD-10-CM

## 2022-03-15 DIAGNOSIS — I25.10 ATHEROSCLEROSIS OF NATIVE CORONARY ARTERY OF NATIVE HEART WITHOUT ANGINA PECTORIS: Primary | ICD-10-CM

## 2022-03-21 ENCOUNTER — LAB VISIT (OUTPATIENT)
Dept: LAB | Facility: HOSPITAL | Age: 62
End: 2022-03-21
Attending: INTERNAL MEDICINE
Payer: MEDICARE

## 2022-03-21 ENCOUNTER — PATIENT MESSAGE (OUTPATIENT)
Dept: ADMINISTRATIVE | Facility: HOSPITAL | Age: 62
End: 2022-03-21
Payer: COMMERCIAL

## 2022-03-21 DIAGNOSIS — I25.10 ATHEROSCLEROSIS OF NATIVE CORONARY ARTERY OF NATIVE HEART WITHOUT ANGINA PECTORIS: ICD-10-CM

## 2022-03-21 DIAGNOSIS — I10 ESSENTIAL HYPERTENSION: ICD-10-CM

## 2022-03-21 LAB
ALBUMIN SERPL BCP-MCNC: 3.8 G/DL (ref 3.5–5.2)
ALP SERPL-CCNC: 46 U/L (ref 55–135)
ALT SERPL W/O P-5'-P-CCNC: 29 U/L (ref 10–44)
ANION GAP SERPL CALC-SCNC: 9 MMOL/L (ref 8–16)
AST SERPL-CCNC: 28 U/L (ref 10–40)
BASOPHILS # BLD AUTO: 0.02 K/UL (ref 0–0.2)
BASOPHILS NFR BLD: 0.4 % (ref 0–1.9)
BILIRUB SERPL-MCNC: 0.6 MG/DL (ref 0.1–1)
BUN SERPL-MCNC: 11 MG/DL (ref 8–23)
CALCIUM SERPL-MCNC: 9.5 MG/DL (ref 8.7–10.5)
CHLORIDE SERPL-SCNC: 107 MMOL/L (ref 95–110)
CHOLEST SERPL-MCNC: 138 MG/DL (ref 120–199)
CHOLEST/HDLC SERPL: 2.9 {RATIO} (ref 2–5)
CO2 SERPL-SCNC: 27 MMOL/L (ref 23–29)
CREAT SERPL-MCNC: 0.9 MG/DL (ref 0.5–1.4)
DIFFERENTIAL METHOD: ABNORMAL
EOSINOPHIL # BLD AUTO: 0.1 K/UL (ref 0–0.5)
EOSINOPHIL NFR BLD: 2 % (ref 0–8)
ERYTHROCYTE [DISTWIDTH] IN BLOOD BY AUTOMATED COUNT: 12.6 % (ref 11.5–14.5)
EST. GFR  (AFRICAN AMERICAN): >60 ML/MIN/1.73 M^2
EST. GFR  (NON AFRICAN AMERICAN): >60 ML/MIN/1.73 M^2
GLUCOSE SERPL-MCNC: 92 MG/DL (ref 70–110)
HCT VFR BLD AUTO: 48 % (ref 40–54)
HDLC SERPL-MCNC: 48 MG/DL (ref 40–75)
HDLC SERPL: 34.8 % (ref 20–50)
HGB BLD-MCNC: 16 G/DL (ref 14–18)
IMM GRANULOCYTES # BLD AUTO: 0.01 K/UL (ref 0–0.04)
IMM GRANULOCYTES NFR BLD AUTO: 0.2 % (ref 0–0.5)
LDLC SERPL CALC-MCNC: 56.4 MG/DL (ref 63–159)
LYMPHOCYTES # BLD AUTO: 1.7 K/UL (ref 1–4.8)
LYMPHOCYTES NFR BLD: 38 % (ref 18–48)
MCH RBC QN AUTO: 32.1 PG (ref 27–31)
MCHC RBC AUTO-ENTMCNC: 33.3 G/DL (ref 32–36)
MCV RBC AUTO: 96 FL (ref 82–98)
MONOCYTES # BLD AUTO: 0.4 K/UL (ref 0.3–1)
MONOCYTES NFR BLD: 9.4 % (ref 4–15)
NEUTROPHILS # BLD AUTO: 2.2 K/UL (ref 1.8–7.7)
NEUTROPHILS NFR BLD: 50 % (ref 38–73)
NONHDLC SERPL-MCNC: 90 MG/DL
NRBC BLD-RTO: 0 /100 WBC
PLATELET # BLD AUTO: 160 K/UL (ref 150–450)
PMV BLD AUTO: 10.7 FL (ref 9.2–12.9)
POTASSIUM SERPL-SCNC: 4.3 MMOL/L (ref 3.5–5.1)
PROT SERPL-MCNC: 7.1 G/DL (ref 6–8.4)
RBC # BLD AUTO: 4.98 M/UL (ref 4.6–6.2)
SODIUM SERPL-SCNC: 143 MMOL/L (ref 136–145)
TRIGL SERPL-MCNC: 168 MG/DL (ref 30–150)
WBC # BLD AUTO: 4.45 K/UL (ref 3.9–12.7)

## 2022-03-21 PROCEDURE — 36415 COLL VENOUS BLD VENIPUNCTURE: CPT | Mod: PO | Performed by: INTERNAL MEDICINE

## 2022-03-21 PROCEDURE — 85025 COMPLETE CBC W/AUTO DIFF WBC: CPT | Performed by: INTERNAL MEDICINE

## 2022-03-21 PROCEDURE — 80053 COMPREHEN METABOLIC PANEL: CPT | Performed by: INTERNAL MEDICINE

## 2022-03-21 PROCEDURE — 80061 LIPID PANEL: CPT | Performed by: INTERNAL MEDICINE

## 2022-03-23 ENCOUNTER — OFFICE VISIT (OUTPATIENT)
Dept: CARDIOLOGY | Facility: CLINIC | Age: 62
End: 2022-03-23
Payer: OTHER MISCELLANEOUS

## 2022-03-23 VITALS
BODY MASS INDEX: 29.07 KG/M2 | SYSTOLIC BLOOD PRESSURE: 137 MMHG | OXYGEN SATURATION: 98 % | WEIGHT: 203.06 LBS | HEART RATE: 69 BPM | HEIGHT: 70 IN | DIASTOLIC BLOOD PRESSURE: 78 MMHG

## 2022-03-23 DIAGNOSIS — I25.10 ATHEROSCLEROSIS OF NATIVE CORONARY ARTERY OF NATIVE HEART WITHOUT ANGINA PECTORIS: Primary | ICD-10-CM

## 2022-03-23 DIAGNOSIS — I25.2 OLD MI (MYOCARDIAL INFARCTION): ICD-10-CM

## 2022-03-23 DIAGNOSIS — I10 ESSENTIAL HYPERTENSION: ICD-10-CM

## 2022-03-23 DIAGNOSIS — I25.5 CARDIOMYOPATHY, ISCHEMIC: ICD-10-CM

## 2022-03-23 DIAGNOSIS — E78.00 PURE HYPERCHOLESTEROLEMIA: ICD-10-CM

## 2022-03-23 PROCEDURE — 99213 OFFICE O/P EST LOW 20 MIN: CPT | Mod: S$GLB,,, | Performed by: INTERNAL MEDICINE

## 2022-03-23 PROCEDURE — 99999 PR PBB SHADOW E&M-EST. PATIENT-LVL IV: ICD-10-PCS | Mod: PBBFAC,,, | Performed by: INTERNAL MEDICINE

## 2022-03-23 PROCEDURE — 99214 OFFICE O/P EST MOD 30 MIN: CPT | Mod: PBBFAC | Performed by: INTERNAL MEDICINE

## 2022-03-23 PROCEDURE — 99999 PR PBB SHADOW E&M-EST. PATIENT-LVL IV: CPT | Mod: PBBFAC,,, | Performed by: INTERNAL MEDICINE

## 2022-03-23 PROCEDURE — 99213 PR OFFICE/OUTPT VISIT, EST, LEVL III, 20-29 MIN: ICD-10-PCS | Mod: S$GLB,,, | Performed by: INTERNAL MEDICINE

## 2022-03-23 NOTE — PROGRESS NOTES
Subjective:    Patient ID:  Ramone Nguyen is a 61 y.o. male who presents for follow-up of Follow-up and Results      HPI  HPI  The swelling seen in July 2021 now mostly gone with change in Rx     Mr. Nguyen has no symptoms to suggest exertional angina.. He has no    symptoms of chf. He has no symptoms of dysrhythmia. He has never had    syncope.       He did have an episode of chest discomfort described as an elephant sitting on his chest in March 2016.  He was evaluated at his local hospital in [place], [place].  There was no heart attack and apparently no evidence of new coronary events.  These symptoms have not recurred and he and his wife believe they're likely due to stress that he was having at the time.     He has had cervical spine surgery and has some residual problems in both legs.  No surgeries in 3 years.     As noted previously, he notices a purplish discoloration to his right foot when he is sitting.  He has no claudication.       He developed angioedema in several locations with ace-i, and I am   reluctant to try an ARB.      He developed acute anterior MI in [facility] 1997 with delay    getting to [facility] at which point LAD occluded and ptca performed. Went on    to develop large anterior scar. Was a  and cigarette smoker at    the time, both of which have been discontinued.  On Workmen's Comp b/o NOFD    Not smoking and now no social alcohol    Bought good bp device and home bps now ok     CHRONIC CONDITIONS:    - Old myocardial infarction    - CHR ISCHEMIC HRT DIS NEC - with decision made by EP not to place AICD    Contraindication to ace-i and ARB    - Other and unspecified hyperlipidemia - not at goal but has had    problems symptomatically with higher dose statin and wants to try better    diet, wt loss, etc.    - Percutaneous transluminal coronary angioplasty status    - Coronary atherosclerosis of native coronary vessel     Probably had Covid Jan 2022 sx for 2 days  test positive  Still no vaccine     03/21/22 0826 HDL 48 -- Final result   03/21/22 0826 CHOL 138 -- Final result   03/21/22 0826 TRIG 168 Important  High Final result   03/21/22 0826 LDLCALC 56.4 Important  Low    Liver wnl    Current Outpatient Medications   Medication Sig Dispense Refill    aspirin (ECOTRIN) 81 MG EC tablet Take 1 tablet (81 mg total) by mouth once daily.  0    baclofen (LIORESAL) 10 MG tablet Take 10 mg by mouth 3 (three) times daily.       carvediloL (COREG) 12.5 MG tablet Take 1 tablet (12.5 mg total) by mouth 2 (two) times daily with meals. 180 tablet 3    cholecalciferol, vitamin D3, (VITAMIN D3) 1,000 unit capsule Take 1 capsule (1,000 Units total) by mouth once daily. 90 capsule 3    cyanocobalamin, vitamin B-12, 500 mcg TbSR Take by mouth.      fish oil-omega-3 fatty acids 300-1,000 mg capsule Take 2 g by mouth once daily.      LYRICA 75 mg capsule 75 mg 4 (four) times daily.       multivitamin capsule Take 1 capsule by mouth once daily.      rosuvastatin (CRESTOR) 20 MG tablet Take 1 tablet (20 mg total) by mouth every evening. 90 tablet 3    UBIQUINOL, BULK, MISC 100 mg by Misc.(Non-Drug; Combo Route) route once daily.       sildenafiL (VIAGRA) 100 MG tablet Take 1 tablet (100 mg total) by mouth daily as needed for Erectile Dysfunction. 30 tablet 3     No current facility-administered medications for this visit.         Review of Systems   Constitutional: Negative.   HENT: Negative.    Eyes: Negative.    Cardiovascular: Negative.    Respiratory: Negative.    Endocrine: Negative.    Hematologic/Lymphatic: Negative.    Skin: Negative.    Musculoskeletal: Negative.    Gastrointestinal: Negative.    Genitourinary: Negative.    Neurological: Negative.    Psychiatric/Behavioral: Negative.    Allergic/Immunologic: Negative.    All other systems reviewed and are negative.       Objective:    Physical Exam  Constitutional:       Comments: /78 (BP Location: Left arm, Patient  "Position: Sitting, BP Method: Medium (Automatic))   Pulse 69   Ht 5' 10" (1.778 m)   Wt 92.1 kg (203 lb 0.7 oz)   SpO2 98%   BMI 29.13 kg/m²      Neck:      Vascular: No JVD.   Cardiovascular:      Rate and Rhythm: Normal rate and regular rhythm.      Pulses: Intact distal pulses.      Heart sounds: Normal heart sounds. No murmur heard.    No gallop.   Pulmonary:      Breath sounds: Normal breath sounds. No rales.   Abdominal:      General: Bowel sounds are normal.      Palpations: Abdomen is soft.           Assessment:       1. Atherosclerosis of native coronary artery of native heart without angina pectoris    2. Cardiomyopathy, ischemic    3. Old MI (myocardial infarction)    4. Pure hypercholesterolemia controlled   5. Essential hypertension  controlled        Plan:       Continue same meds  Renew Advil cold and sinus 200/30 1 po bid prn  See 6 months with labs in one year            "

## 2022-05-31 ENCOUNTER — PATIENT MESSAGE (OUTPATIENT)
Dept: ADMINISTRATIVE | Facility: HOSPITAL | Age: 62
End: 2022-05-31
Payer: COMMERCIAL

## 2022-07-27 DIAGNOSIS — Z12.11 COLON CANCER SCREENING: ICD-10-CM

## 2022-08-01 ENCOUNTER — PATIENT MESSAGE (OUTPATIENT)
Dept: ADMINISTRATIVE | Facility: HOSPITAL | Age: 62
End: 2022-08-01
Payer: COMMERCIAL

## 2022-08-31 NOTE — TELEPHONE ENCOUNTER
Patient returned call to schedule procedure. Date, time, and instructions given. Patient verbalized understanding.   
show

## 2022-09-14 RX ORDER — IBUPROFEN AND PSEUDOEPHEDRINE HYDROCHLORIDE 200; 30 MG/1; MG/1
TABLET, COATED ORAL
Refills: 4 | OUTPATIENT
Start: 2022-09-14

## 2022-11-16 RX ORDER — IBUPROFEN AND PSEUDOEPHEDRINE HYDROCHLORIDE 200; 30 MG/1; MG/1
TABLET, COATED ORAL
Qty: 60 EACH | Refills: 4 | Status: SHIPPED | OUTPATIENT
Start: 2022-11-16

## 2022-11-17 NOTE — TELEPHONE ENCOUNTER
----- Message from Jenelle Finnegan MA sent at 11/17/2022 10:13 AM CST -----  The patient Rx. Advil cold and sinus was on print please sent it to his pharmacy. Thank you.

## 2022-12-13 ENCOUNTER — LAB VISIT (OUTPATIENT)
Dept: LAB | Facility: HOSPITAL | Age: 62
End: 2022-12-13
Attending: SPECIALIST
Payer: MEDICARE

## 2022-12-13 DIAGNOSIS — Z12.5 SPECIAL SCREENING FOR MALIGNANT NEOPLASM OF PROSTATE: ICD-10-CM

## 2022-12-13 DIAGNOSIS — Z12.5 SPECIAL SCREENING FOR MALIGNANT NEOPLASM OF PROSTATE: Primary | ICD-10-CM

## 2022-12-13 LAB — COMPLEXED PSA SERPL-MCNC: 0.89 NG/ML (ref 0–4)

## 2022-12-13 PROCEDURE — 84153 ASSAY OF PSA TOTAL: CPT | Performed by: SPECIALIST

## 2022-12-13 PROCEDURE — 36415 COLL VENOUS BLD VENIPUNCTURE: CPT | Performed by: SPECIALIST

## 2023-02-23 ENCOUNTER — LAB VISIT (OUTPATIENT)
Dept: LAB | Facility: HOSPITAL | Age: 63
End: 2023-02-23
Attending: INTERNAL MEDICINE
Payer: OTHER MISCELLANEOUS

## 2023-02-23 DIAGNOSIS — I25.10 ATHEROSCLEROSIS OF NATIVE CORONARY ARTERY OF NATIVE HEART WITHOUT ANGINA PECTORIS: ICD-10-CM

## 2023-02-23 LAB
ALBUMIN SERPL BCP-MCNC: 4.1 G/DL (ref 3.5–5.2)
ALP SERPL-CCNC: 47 U/L (ref 55–135)
ALT SERPL W/O P-5'-P-CCNC: 28 U/L (ref 10–44)
ANION GAP SERPL CALC-SCNC: 11 MMOL/L (ref 8–16)
AST SERPL-CCNC: 29 U/L (ref 10–40)
BASOPHILS # BLD AUTO: 0.02 K/UL (ref 0–0.2)
BASOPHILS NFR BLD: 0.4 % (ref 0–1.9)
BILIRUB SERPL-MCNC: 0.7 MG/DL (ref 0.1–1)
BUN SERPL-MCNC: 17 MG/DL (ref 8–23)
CALCIUM SERPL-MCNC: 9.5 MG/DL (ref 8.7–10.5)
CHLORIDE SERPL-SCNC: 106 MMOL/L (ref 95–110)
CHOLEST SERPL-MCNC: 161 MG/DL (ref 120–199)
CHOLEST/HDLC SERPL: 3.7 {RATIO} (ref 2–5)
CO2 SERPL-SCNC: 22 MMOL/L (ref 23–29)
CREAT SERPL-MCNC: 1 MG/DL (ref 0.5–1.4)
DIFFERENTIAL METHOD: ABNORMAL
EOSINOPHIL # BLD AUTO: 0.1 K/UL (ref 0–0.5)
EOSINOPHIL NFR BLD: 1.6 % (ref 0–8)
ERYTHROCYTE [DISTWIDTH] IN BLOOD BY AUTOMATED COUNT: 12.8 % (ref 11.5–14.5)
EST. GFR  (NO RACE VARIABLE): >60 ML/MIN/1.73 M^2
GLUCOSE SERPL-MCNC: 83 MG/DL (ref 70–110)
HCT VFR BLD AUTO: 49.2 % (ref 40–54)
HDLC SERPL-MCNC: 43 MG/DL (ref 40–75)
HDLC SERPL: 26.7 % (ref 20–50)
HGB BLD-MCNC: 16.2 G/DL (ref 14–18)
IMM GRANULOCYTES # BLD AUTO: 0.01 K/UL (ref 0–0.04)
IMM GRANULOCYTES NFR BLD AUTO: 0.2 % (ref 0–0.5)
LDLC SERPL CALC-MCNC: 89.2 MG/DL (ref 63–159)
LYMPHOCYTES # BLD AUTO: 2 K/UL (ref 1–4.8)
LYMPHOCYTES NFR BLD: 36.8 % (ref 18–48)
MCH RBC QN AUTO: 31.4 PG (ref 27–31)
MCHC RBC AUTO-ENTMCNC: 32.9 G/DL (ref 32–36)
MCV RBC AUTO: 95 FL (ref 82–98)
MONOCYTES # BLD AUTO: 0.5 K/UL (ref 0.3–1)
MONOCYTES NFR BLD: 9.7 % (ref 4–15)
NEUTROPHILS # BLD AUTO: 2.8 K/UL (ref 1.8–7.7)
NEUTROPHILS NFR BLD: 51.3 % (ref 38–73)
NONHDLC SERPL-MCNC: 118 MG/DL
NRBC BLD-RTO: 0 /100 WBC
PLATELET # BLD AUTO: 163 K/UL (ref 150–450)
PMV BLD AUTO: 10.7 FL (ref 9.2–12.9)
POTASSIUM SERPL-SCNC: 4.7 MMOL/L (ref 3.5–5.1)
PROT SERPL-MCNC: 7.3 G/DL (ref 6–8.4)
RBC # BLD AUTO: 5.16 M/UL (ref 4.6–6.2)
SODIUM SERPL-SCNC: 139 MMOL/L (ref 136–145)
TRIGL SERPL-MCNC: 144 MG/DL (ref 30–150)
WBC # BLD AUTO: 5.46 K/UL (ref 3.9–12.7)

## 2023-02-23 PROCEDURE — 80053 COMPREHEN METABOLIC PANEL: CPT | Performed by: INTERNAL MEDICINE

## 2023-02-23 PROCEDURE — 36415 COLL VENOUS BLD VENIPUNCTURE: CPT | Mod: PO | Performed by: INTERNAL MEDICINE

## 2023-02-23 PROCEDURE — 80061 LIPID PANEL: CPT | Performed by: INTERNAL MEDICINE

## 2023-02-23 PROCEDURE — 85025 COMPLETE CBC W/AUTO DIFF WBC: CPT | Performed by: INTERNAL MEDICINE

## 2023-02-27 ENCOUNTER — OFFICE VISIT (OUTPATIENT)
Dept: CARDIOLOGY | Facility: CLINIC | Age: 63
End: 2023-02-27
Payer: OTHER MISCELLANEOUS

## 2023-02-27 VITALS
SYSTOLIC BLOOD PRESSURE: 125 MMHG | WEIGHT: 203.25 LBS | DIASTOLIC BLOOD PRESSURE: 79 MMHG | HEART RATE: 75 BPM | OXYGEN SATURATION: 98 % | HEIGHT: 70 IN | BODY MASS INDEX: 29.1 KG/M2

## 2023-02-27 DIAGNOSIS — I25.10 ATHEROSCLEROSIS OF NATIVE CORONARY ARTERY OF NATIVE HEART WITHOUT ANGINA PECTORIS: Primary | ICD-10-CM

## 2023-02-27 DIAGNOSIS — I10 ESSENTIAL HYPERTENSION: ICD-10-CM

## 2023-02-27 DIAGNOSIS — E78.00 PURE HYPERCHOLESTEROLEMIA: ICD-10-CM

## 2023-02-27 DIAGNOSIS — I25.2 OLD MI (MYOCARDIAL INFARCTION): ICD-10-CM

## 2023-02-27 DIAGNOSIS — I25.5 CARDIOMYOPATHY, ISCHEMIC: ICD-10-CM

## 2023-02-27 DIAGNOSIS — Z98.61 S/P PTCA (PERCUTANEOUS TRANSLUMINAL CORONARY ANGIOPLASTY): ICD-10-CM

## 2023-02-27 PROCEDURE — 99999 PR PBB SHADOW E&M-EST. PATIENT-LVL IV: CPT | Mod: PBBFAC,,, | Performed by: INTERNAL MEDICINE

## 2023-02-27 PROCEDURE — 99214 PR OFFICE/OUTPT VISIT, EST, LEVL IV, 30-39 MIN: ICD-10-PCS | Mod: S$GLB,,, | Performed by: INTERNAL MEDICINE

## 2023-02-27 PROCEDURE — 99214 OFFICE O/P EST MOD 30 MIN: CPT | Mod: S$GLB,,, | Performed by: INTERNAL MEDICINE

## 2023-02-27 PROCEDURE — 99999 PR PBB SHADOW E&M-EST. PATIENT-LVL IV: ICD-10-PCS | Mod: PBBFAC,,, | Performed by: INTERNAL MEDICINE

## 2023-02-27 RX ORDER — ROSUVASTATIN CALCIUM 40 MG/1
40 TABLET, COATED ORAL NIGHTLY
Qty: 90 TABLET | Refills: 3 | Status: SHIPPED | OUTPATIENT
Start: 2023-02-27 | End: 2024-02-08

## 2023-02-27 NOTE — PROGRESS NOTES
Subjective:    Patient ID:  Ramone Nguyen is a 62 y.o. male who presents for follow-up of Establish Care and Follow-up      Follow-up  Mr. Nguyen has no symptoms to suggest exertional angina.. He has no    symptoms of chf. He has no symptoms of dysrhythmia. He has never had    syncope.       He did have an episode of chest discomfort described as an elephant sitting on his chest in March 2016.  He was evaluated at his local hospital in [place], [place].  There was no heart attack and apparently no evidence of new coronary events.  These symptoms have not recurred and he and his wife believe they're likely due to stress that he was having at the time.     He has had cervical spine surgery and has some residual problems in both legs, worse on the right.  No recent surgeries.  Has what is probably neurogenic problems rt leg.  Needs to hold handrail to get up steps  Has balance issues and has fallen if turns around too quickly     As noted previously, he notices a purplish discoloration to his right foot when he is sitting.  He has no claudication.       He developed angioedema in several locations with ace-i, and I am   reluctant to try an ARB.      He developed acute anterior MI in [facility] custodial 1997 with delay    getting to [facility] at which point LAD occluded and ptca performed. Went on    to develop large anterior scar. Was a  and cigarette smoker at    the time, both of which have been discontinued.  On Workmen's Comp b/o NOFD     Not smoking and now no social alcohol     Bought good bp device and home bps now ok     CHRONIC CONDITIONS:    - Old myocardial infarction    - CHR ISCHEMIC HRT DIS NEC - with decision made by EP not to place AICD    Contraindication to ace-i and ARB    - Other and unspecified hyperlipidemia - not at goal but has had    problems symptomatically with higher dose statin and wants to try better    diet, wt loss, etc.    - Percutaneous transluminal coronary angioplasty  status    - Coronary atherosclerosis of native coronary vessel      Probably had Covid Jan 2022 sx for 2 days test positive  Still no vaccine    Current Outpatient Medications   Medication Sig Dispense Refill    ADVIL COLD AND SINUS  mg Tab TAKE ONE TABLET BY MOUTH TWICE DAILY AS NEEDED 60 each 4    aspirin (ECOTRIN) 81 MG EC tablet Take 1 tablet (81 mg total) by mouth once daily.  0    baclofen (LIORESAL) 10 MG tablet Take 10 mg by mouth 3 (three) times daily.       carvediloL (COREG) 12.5 MG tablet TAKE ONE TABLET BY MOUTH TWICE DAILY WITH MEALS 180 tablet 3    cholecalciferol, vitamin D3, (VITAMIN D3) 1,000 unit capsule Take 1 capsule (1,000 Units total) by mouth once daily. 90 capsule 3    cyanocobalamin, vitamin B-12, 500 mcg TbSR Take by mouth.      fish oil-omega-3 fatty acids 300-1,000 mg capsule Take 2 g by mouth once daily.      LYRICA 75 mg capsule 75 mg 4 (four) times daily.       multivitamin capsule Take 1 capsule by mouth once daily.      UBIQUINOL, BULK, MISC 100 mg by Misc.(Non-Drug; Combo Route) route once daily.       rosuvastatin (CRESTOR) 40 MG Tab  INCREASED FROM 20 TODAY Take 1 tablet (40 mg total) by mouth every evening. 90 tablet 3    sildenafiL (VIAGRA) 100 MG tablet Take 1 tablet (100 mg total) by mouth daily as needed for Erectile Dysfunction. 30 tablet 3     No current facility-administered medications for this visit.     Misses rare rosuvastatin    02/23/23 0835 HDL 43 -- Final result   02/23/23 0835 CHOL 161 -- Final result   02/23/23 0835 TRIG 144 -- Final result   02/23/23 0835 LDLCALC 89.2 --    LDL has jumped from mid 50s - he agrees to take larger dose statin    Review of Systems   Constitutional: Negative.   HENT: Negative.     Eyes: Negative.    Cardiovascular: Negative.    Respiratory: Negative.     Endocrine: Negative.    Hematologic/Lymphatic: Negative.    Skin: Negative.    Musculoskeletal: Negative.    Gastrointestinal: Negative.    Genitourinary: Negative.   "  Neurological: Negative.    Psychiatric/Behavioral: Negative.        Objective:    Physical Exam  Constitutional:       Comments: /79 (BP Location: Left arm, Patient Position: Sitting, BP Method: Medium (Automatic))   Pulse 75   Ht 5' 10" (1.778 m)   Wt 92.2 kg (203 lb 4.2 oz)   SpO2 98%   BMI 29.17 kg/m²      Neck:      Vascular: No carotid bruit or JVD.   Cardiovascular:      Rate and Rhythm: Normal rate and regular rhythm.      Pulses: Intact distal pulses.      Heart sounds: Normal heart sounds. No murmur heard.    No gallop.   Pulmonary:      Breath sounds: Normal breath sounds. No rales.   Abdominal:      General: Bowel sounds are normal.      Palpations: Abdomen is soft.   Musculoskeletal:      Right lower leg: No edema.      Left lower leg: No edema.         Assessment:       1. Atherosclerosis of native coronary artery of native heart without angina pectoris    2. Cardiomyopathy, ischemic    3. Old MI (myocardial infarction)    4. Pure hypercholesterolemia - now not well controlled   5. Essential hypertension    6. S/P PTCA (percutaneous transluminal coronary angioplasty)         Plan:       Increase rosuvastatin to 40/d  See in 6 month intervals b/o needed with Workman's Comp - I am happy to do every other visit as Virtual, so long as I physically see him once/yr              "

## 2023-05-12 DIAGNOSIS — R31.1 BENIGN MICROSCOPIC HEMATURIA: Primary | ICD-10-CM

## 2023-05-19 ENCOUNTER — HOSPITAL ENCOUNTER (OUTPATIENT)
Dept: RADIOLOGY | Facility: HOSPITAL | Age: 63
Discharge: HOME | End: 2023-05-19
Attending: SPECIALIST
Payer: MEDICARE

## 2023-05-19 DIAGNOSIS — R31.1 BENIGN MICROSCOPIC HEMATURIA: ICD-10-CM

## 2023-05-19 PROCEDURE — 76770 US EXAM ABDO BACK WALL COMP: CPT | Mod: TC

## 2023-06-26 RX ORDER — CARVEDILOL 12.5 MG/1
TABLET ORAL
Qty: 180 TABLET | Refills: 3 | Status: SHIPPED | OUTPATIENT
Start: 2023-06-26

## 2023-07-17 ENCOUNTER — TELEPHONE (OUTPATIENT)
Dept: CARDIOLOGY | Facility: CLINIC | Age: 63
End: 2023-07-17
Payer: COMMERCIAL

## 2023-07-17 NOTE — TELEPHONE ENCOUNTER
----- Message from Candice Hernandez sent at 7/17/2023 11:17 AM CDT -----  Regarding: Appt  Patient wife calling regarding a letter he received, please call back @ [phone]. Thank you Candice

## 2023-07-18 NOTE — TELEPHONE ENCOUNTER
Message sent to patient with results of labs online.    -Natan Siegel    0 (no pain/absence of nonverbal indicators of pain)

## 2023-08-15 RX ORDER — IBUPROFEN AND PSEUDOEPHEDRINE HYDROCHLORIDE 200; 30 MG/1; MG/1
TABLET, COATED ORAL
Refills: 4 | OUTPATIENT
Start: 2023-08-15

## 2023-10-12 ENCOUNTER — TELEPHONE (OUTPATIENT)
Dept: CARDIOLOGY | Facility: CLINIC | Age: 63
End: 2023-10-12
Payer: COMMERCIAL

## 2023-10-12 DIAGNOSIS — I25.10 ATHEROSCLEROSIS OF NATIVE CORONARY ARTERY WITHOUT ANGINA PECTORIS, UNSPECIFIED WHETHER NATIVE OR TRANSPLANTED HEART: Primary | ICD-10-CM

## 2023-10-12 DIAGNOSIS — I25.119 CORONARY ARTERY DISEASE INVOLVING NATIVE CORONARY ARTERY OF NATIVE HEART WITH ANGINA PECTORIS: Primary | ICD-10-CM

## 2023-10-12 NOTE — TELEPHONE ENCOUNTER
----- Message from Jenelle Finnegan MA sent at 10/11/2023  1:04 PM CDT -----  The patient need to talk to you about schedule his labs please call [phone]. Thank you.

## 2023-10-13 ENCOUNTER — PATIENT MESSAGE (OUTPATIENT)
Dept: CARDIOLOGY | Facility: CLINIC | Age: 63
End: 2023-10-13
Payer: COMMERCIAL

## 2023-10-16 ENCOUNTER — LAB VISIT (OUTPATIENT)
Dept: LAB | Facility: HOSPITAL | Age: 63
End: 2023-10-16
Attending: INTERNAL MEDICINE
Payer: MEDICARE

## 2023-10-16 DIAGNOSIS — I25.10 ATHEROSCLEROSIS OF NATIVE CORONARY ARTERY WITHOUT ANGINA PECTORIS, UNSPECIFIED WHETHER NATIVE OR TRANSPLANTED HEART: ICD-10-CM

## 2023-10-16 LAB
ALBUMIN SERPL BCP-MCNC: 3.8 G/DL (ref 3.5–5.2)
ALP SERPL-CCNC: 52 U/L (ref 55–135)
ALT SERPL W/O P-5'-P-CCNC: 23 U/L (ref 10–44)
ANION GAP SERPL CALC-SCNC: 7 MMOL/L (ref 8–16)
AST SERPL-CCNC: 30 U/L (ref 10–40)
BASOPHILS # BLD AUTO: 0.03 K/UL (ref 0–0.2)
BASOPHILS NFR BLD: 0.5 % (ref 0–1.9)
BILIRUB SERPL-MCNC: 0.5 MG/DL (ref 0.1–1)
BUN SERPL-MCNC: 15 MG/DL (ref 8–23)
CALCIUM SERPL-MCNC: 9.4 MG/DL (ref 8.7–10.5)
CHLORIDE SERPL-SCNC: 107 MMOL/L (ref 95–110)
CHOLEST SERPL-MCNC: 142 MG/DL (ref 120–199)
CHOLEST/HDLC SERPL: 2.9 {RATIO} (ref 2–5)
CO2 SERPL-SCNC: 25 MMOL/L (ref 23–29)
CREAT SERPL-MCNC: 1 MG/DL (ref 0.5–1.4)
DIFFERENTIAL METHOD: ABNORMAL
EOSINOPHIL # BLD AUTO: 0.1 K/UL (ref 0–0.5)
EOSINOPHIL NFR BLD: 2 % (ref 0–8)
ERYTHROCYTE [DISTWIDTH] IN BLOOD BY AUTOMATED COUNT: 12.9 % (ref 11.5–14.5)
EST. GFR  (NO RACE VARIABLE): >60 ML/MIN/1.73 M^2
GLUCOSE SERPL-MCNC: 93 MG/DL (ref 70–110)
HCT VFR BLD AUTO: 49.1 % (ref 40–54)
HDLC SERPL-MCNC: 49 MG/DL (ref 40–75)
HDLC SERPL: 34.5 % (ref 20–50)
HGB BLD-MCNC: 16.4 G/DL (ref 14–18)
IMM GRANULOCYTES # BLD AUTO: 0.01 K/UL (ref 0–0.04)
IMM GRANULOCYTES NFR BLD AUTO: 0.2 % (ref 0–0.5)
LDLC SERPL CALC-MCNC: 63.6 MG/DL (ref 63–159)
LYMPHOCYTES # BLD AUTO: 2.1 K/UL (ref 1–4.8)
LYMPHOCYTES NFR BLD: 36.9 % (ref 18–48)
MCH RBC QN AUTO: 31.3 PG (ref 27–31)
MCHC RBC AUTO-ENTMCNC: 33.4 G/DL (ref 32–36)
MCV RBC AUTO: 94 FL (ref 82–98)
MONOCYTES # BLD AUTO: 0.6 K/UL (ref 0.3–1)
MONOCYTES NFR BLD: 9.8 % (ref 4–15)
NEUTROPHILS # BLD AUTO: 2.9 K/UL (ref 1.8–7.7)
NEUTROPHILS NFR BLD: 50.6 % (ref 38–73)
NONHDLC SERPL-MCNC: 93 MG/DL
NRBC BLD-RTO: 0 /100 WBC
PLATELET # BLD AUTO: 158 K/UL (ref 150–450)
PMV BLD AUTO: 10.3 FL (ref 9.2–12.9)
POTASSIUM SERPL-SCNC: 4.3 MMOL/L (ref 3.5–5.1)
PROT SERPL-MCNC: 7.3 G/DL (ref 6–8.4)
RBC # BLD AUTO: 5.24 M/UL (ref 4.6–6.2)
SODIUM SERPL-SCNC: 139 MMOL/L (ref 136–145)
TRIGL SERPL-MCNC: 147 MG/DL (ref 30–150)
WBC # BLD AUTO: 5.63 K/UL (ref 3.9–12.7)

## 2023-10-16 PROCEDURE — 80053 COMPREHEN METABOLIC PANEL: CPT | Performed by: INTERNAL MEDICINE

## 2023-10-16 PROCEDURE — 80061 LIPID PANEL: CPT | Performed by: INTERNAL MEDICINE

## 2023-10-16 PROCEDURE — 36415 COLL VENOUS BLD VENIPUNCTURE: CPT | Mod: PO | Performed by: INTERNAL MEDICINE

## 2023-10-16 PROCEDURE — 85025 COMPLETE CBC W/AUTO DIFF WBC: CPT | Performed by: INTERNAL MEDICINE

## 2023-10-18 ENCOUNTER — OFFICE VISIT (OUTPATIENT)
Dept: CARDIOLOGY | Facility: CLINIC | Age: 63
End: 2023-10-18
Payer: MEDICARE

## 2023-10-18 VITALS
DIASTOLIC BLOOD PRESSURE: 77 MMHG | HEART RATE: 67 BPM | BODY MASS INDEX: 28.28 KG/M2 | HEIGHT: 70 IN | WEIGHT: 197.56 LBS | SYSTOLIC BLOOD PRESSURE: 132 MMHG

## 2023-10-18 DIAGNOSIS — I25.5 CARDIOMYOPATHY, ISCHEMIC: ICD-10-CM

## 2023-10-18 DIAGNOSIS — I25.2 OLD MI (MYOCARDIAL INFARCTION): ICD-10-CM

## 2023-10-18 DIAGNOSIS — Z98.61 S/P PTCA (PERCUTANEOUS TRANSLUMINAL CORONARY ANGIOPLASTY): ICD-10-CM

## 2023-10-18 DIAGNOSIS — I10 ESSENTIAL HYPERTENSION: ICD-10-CM

## 2023-10-18 DIAGNOSIS — E78.00 PURE HYPERCHOLESTEROLEMIA: ICD-10-CM

## 2023-10-18 DIAGNOSIS — I25.10 ATHEROSCLEROSIS OF NATIVE CORONARY ARTERY OF NATIVE HEART WITHOUT ANGINA PECTORIS: Primary | ICD-10-CM

## 2023-10-18 PROCEDURE — 99213 PR OFFICE/OUTPT VISIT, EST, LEVL III, 20-29 MIN: ICD-10-PCS | Mod: S$PBB,,, | Performed by: INTERNAL MEDICINE

## 2023-10-18 PROCEDURE — 99999 PR PBB SHADOW E&M-EST. PATIENT-LVL IV: ICD-10-PCS | Mod: PBBFAC,,, | Performed by: INTERNAL MEDICINE

## 2023-10-18 PROCEDURE — 99214 OFFICE O/P EST MOD 30 MIN: CPT | Mod: PBBFAC | Performed by: INTERNAL MEDICINE

## 2023-10-18 PROCEDURE — 99213 OFFICE O/P EST LOW 20 MIN: CPT | Mod: S$PBB,,, | Performed by: INTERNAL MEDICINE

## 2023-10-18 PROCEDURE — 99999 PR PBB SHADOW E&M-EST. PATIENT-LVL IV: CPT | Mod: PBBFAC,,, | Performed by: INTERNAL MEDICINE

## 2023-10-18 NOTE — PROGRESS NOTES
"Subjective:   Patient ID:  Ramone Nguyen is a 63 y.o. male who presents for {Misc; evaluation/follow-up:32212::"follow-up"} of No chief complaint on file.      HPI    ROS    Objective:   Physical Exam    Assessment:      No diagnosis found.    Plan:           "

## 2023-10-18 NOTE — PROGRESS NOTES
Subjective:   Patient ID:  Ramone Nguyen is a 63 y.o. male who presents for follow-up of hospitals Care      HPI  Mr. Nguyen has no symptoms to suggest exertional angina.. He has no    symptoms of chf. He has no symptoms of dysrhythmia. He has never had    syncope.       He did have an episode of chest discomfort described as an elephant sitting on his chest in March 2016.  He was evaluated at his local hospital in [place], [place].  There was no heart attack and apparently no evidence of new coronary events.  These symptoms have not recurred and he and his wife believe they're likely due to stress that he was having at the time.     He has had cervical spine surgery and has some residual problems in both legs, worse on the right.  No recent surgeries.  Has what is probably neurogenic problems rt leg.  Needs to hold handrail to get up steps  Has balance issues and has fallen if turns around too quickly  Able to walk up to 1 mile without CV sx, including no claudication     As noted previously, he notices a purplish discoloration to his right foot when he is sitting.  He has no claudication.       He developed angioedema in several locations with ace-i, and I am   reluctant to try an ARB.      He developed acute anterior MI in [facility] 1997 with delay    getting to [facility] at which point LAD occluded and ptca performed. Went on    to develop large anterior scar. Was a  and cigarette smoker at    the time, both of which have been discontinued.  On Workmen's Comp b/o NOFD     Not smoking and now no social alcohol     Bought good bp device and home bps now ok - not taking much these days     CHRONIC CONDITIONS:    - Old myocardial infarction    - CHR ISCHEMIC HRT DIS NEC - with decision made by EP not to place AICD    Contraindication to ace-i and ARB    - Other and unspecified hyperlipidemia - not at goal but has had    problems symptomatically with higher dose statin and wants to try  better    diet, wt loss, etc.    - Percutaneous transluminal coronary angioplasty status    - Coronary atherosclerosis of native coronary vessel      Probably had Covid Jan 2022 sx for 2 days test positive  Still no vaccine    Current Outpatient Medications   Medication Sig Dispense Refill    ADVIL COLD AND SINUS  mg Tab TAKE ONE TABLET BY MOUTH TWICE DAILY AS NEEDED 60 each 4    aspirin (ECOTRIN) 81 MG EC tablet Take 1 tablet (81 mg total) by mouth once daily.  0    baclofen (LIORESAL) 10 MG tablet Take 10 mg by mouth 3 (three) times daily.       carvediloL (COREG) 12.5 MG tablet TAKE ONE TABLET BY MOUTH TWICE DAILY WITH MEALS 180 tablet 3    cyanocobalamin, vitamin B-12, 500 mcg TbSR Take by mouth.      fish oil-omega-3 fatty acids 300-1,000 mg capsule Take 2 g by mouth once daily.      LYRICA 75 mg capsule 75 mg 4 (four) times daily.       multivitamin capsule Take 1 capsule by mouth once daily.      rosuvastatin (CRESTOR) 40 MG Tab Take 1 tablet (40 mg total) by mouth every evening. 90 tablet 3    UBIQUINOL, BULK, MISC 100 mg by Misc.(Non-Drug; Combo Route) route once daily.       cholecalciferol, vitamin D3, (VITAMIN D3) 1,000 unit capsule Take 1 capsule (1,000 Units total) by mouth once daily. (Patient not taking: Reported on 10/18/2023) 90 capsule 3    sildenafiL (VIAGRA) 100 MG tablet Take 1 tablet (100 mg total) by mouth daily as needed for Erectile Dysfunction. 30 tablet 3     No current facility-administered medications for this visit.     10/16/23 0835 HDL 49 -- Final result   10/16/23 0835 CHOL 142 -- Final result   10/16/23 0835 TRIG 147 -- Final result   10/16/23 0835 LDLCALC 63.6 -- Final result   10/16/23 0835 CHOLHDL 34.5 -- Final result   Liver wnl    Review of Systems   Constitutional: Negative.   HENT: Negative.     Eyes: Negative.    Cardiovascular: Negative.    Respiratory: Negative.     Endocrine: Negative.    Skin: Negative.    Musculoskeletal: Negative.      Objective:   Physical  "Exam  Constitutional:       Comments: /77   Pulse 67   Ht 5' 10" (1.778 m)   Wt 89.6 kg (197 lb 8.5 oz)   BMI 28.34 kg/m²      Neck:      Vascular: No carotid bruit or JVD.   Cardiovascular:      Rate and Rhythm: Normal rate and regular rhythm.      Pulses: Intact distal pulses.      Heart sounds: Normal heart sounds. No murmur heard.     No gallop.   Pulmonary:      Breath sounds: Normal breath sounds. No rales.   Abdominal:      General: Bowel sounds are normal.      Palpations: Abdomen is soft.   Musculoskeletal:      Right lower leg: No edema.      Left lower leg: No edema.         Assessment:      1. Atherosclerosis of native coronary artery of native heart without angina pectoris    2. Cardiomyopathy, ischemic    3. Old MI (myocardial infarction)    4. Pure hypercholesterolemia    5. Essential hypertension    6. S/P PTCA (percutaneous transluminal coronary angioplasty)        Plan:     Continue same meds  See in 6 months  Will order labs      "

## 2024-02-08 RX ORDER — ROSUVASTATIN CALCIUM 40 MG/1
40 TABLET, COATED ORAL NIGHTLY
Qty: 90 TABLET | Refills: 3 | Status: SHIPPED | OUTPATIENT
Start: 2024-02-08

## 2024-03-13 ENCOUNTER — TELEPHONE (OUTPATIENT)
Dept: CARDIOLOGY | Facility: CLINIC | Age: 64
End: 2024-03-13
Payer: COMMERCIAL

## 2024-03-13 NOTE — TELEPHONE ENCOUNTER
----- Message from Radha Pringle MA sent at 3/13/2024  2:52 PM CDT -----  Contact: self  Pt is calling to make an appt with . He is a (workman comp.pt). Please call [phone]

## 2024-03-22 ENCOUNTER — TELEPHONE (OUTPATIENT)
Dept: CARDIOLOGY | Facility: CLINIC | Age: 64
End: 2024-03-22
Payer: COMMERCIAL

## 2024-03-22 NOTE — TELEPHONE ENCOUNTER
----- Message from Candice Hernandez sent at 3/22/2024 10:04 AM CDT -----  Regarding: Appt  Patient calling to schedule an appt.  Please call back @ [phone]. Thank you Candice     Azathioprine Pregnancy And Lactation Text: This medication is Pregnancy Category D and isn't considered safe during pregnancy. It is unknown if this medication is excreted in breast milk.

## 2024-04-25 ENCOUNTER — LAB VISIT (OUTPATIENT)
Dept: LAB | Facility: HOSPITAL | Age: 64
End: 2024-04-25
Attending: INTERNAL MEDICINE
Payer: COMMERCIAL

## 2024-04-25 DIAGNOSIS — I25.5 CARDIOMYOPATHY, ISCHEMIC: ICD-10-CM

## 2024-04-25 LAB
ALBUMIN SERPL BCP-MCNC: 3.9 G/DL (ref 3.5–5.2)
ALP SERPL-CCNC: 62 U/L (ref 55–135)
ALT SERPL W/O P-5'-P-CCNC: 19 U/L (ref 10–44)
ANION GAP SERPL CALC-SCNC: 9 MMOL/L (ref 8–16)
AST SERPL-CCNC: 26 U/L (ref 10–40)
BILIRUB SERPL-MCNC: 0.5 MG/DL (ref 0.1–1)
BUN SERPL-MCNC: 20 MG/DL (ref 8–23)
CALCIUM SERPL-MCNC: 9.4 MG/DL (ref 8.7–10.5)
CHLORIDE SERPL-SCNC: 105 MMOL/L (ref 95–110)
CHOLEST SERPL-MCNC: 129 MG/DL (ref 120–199)
CHOLEST/HDLC SERPL: 2.7 {RATIO} (ref 2–5)
CO2 SERPL-SCNC: 27 MMOL/L (ref 23–29)
CREAT SERPL-MCNC: 1.3 MG/DL (ref 0.5–1.4)
EST. GFR  (NO RACE VARIABLE): >60 ML/MIN/1.73 M^2
GLUCOSE SERPL-MCNC: 82 MG/DL (ref 70–110)
HDLC SERPL-MCNC: 48 MG/DL (ref 40–75)
HDLC SERPL: 37.2 % (ref 20–50)
LDLC SERPL CALC-MCNC: 60.2 MG/DL (ref 63–159)
NONHDLC SERPL-MCNC: 81 MG/DL
POTASSIUM SERPL-SCNC: 4.1 MMOL/L (ref 3.5–5.1)
PROT SERPL-MCNC: 7.3 G/DL (ref 6–8.4)
SODIUM SERPL-SCNC: 141 MMOL/L (ref 136–145)
TRIGL SERPL-MCNC: 104 MG/DL (ref 30–150)

## 2024-04-25 PROCEDURE — 80053 COMPREHEN METABOLIC PANEL: CPT | Performed by: INTERNAL MEDICINE

## 2024-04-25 PROCEDURE — 80061 LIPID PANEL: CPT | Performed by: INTERNAL MEDICINE

## 2024-04-25 PROCEDURE — 36415 COLL VENOUS BLD VENIPUNCTURE: CPT | Mod: PO | Performed by: INTERNAL MEDICINE

## 2024-04-30 ENCOUNTER — OFFICE VISIT (OUTPATIENT)
Dept: CARDIOLOGY | Facility: CLINIC | Age: 64
End: 2024-04-30
Payer: COMMERCIAL

## 2024-04-30 VITALS
BODY MASS INDEX: 28.28 KG/M2 | HEIGHT: 70 IN | DIASTOLIC BLOOD PRESSURE: 82 MMHG | WEIGHT: 197.56 LBS | HEART RATE: 64 BPM | SYSTOLIC BLOOD PRESSURE: 136 MMHG

## 2024-04-30 DIAGNOSIS — I25.5 CARDIOMYOPATHY, ISCHEMIC: ICD-10-CM

## 2024-04-30 DIAGNOSIS — I25.2 OLD MI (MYOCARDIAL INFARCTION): ICD-10-CM

## 2024-04-30 DIAGNOSIS — E78.00 PURE HYPERCHOLESTEROLEMIA: ICD-10-CM

## 2024-04-30 DIAGNOSIS — Z98.61 S/P PTCA (PERCUTANEOUS TRANSLUMINAL CORONARY ANGIOPLASTY): ICD-10-CM

## 2024-04-30 DIAGNOSIS — I10 ESSENTIAL HYPERTENSION: ICD-10-CM

## 2024-04-30 DIAGNOSIS — I25.10 ATHEROSCLEROSIS OF NATIVE CORONARY ARTERY OF NATIVE HEART WITHOUT ANGINA PECTORIS: ICD-10-CM

## 2024-04-30 DIAGNOSIS — M79.10 MYALGIA: Primary | ICD-10-CM

## 2024-04-30 PROCEDURE — 99214 OFFICE O/P EST MOD 30 MIN: CPT | Mod: PBBFAC | Performed by: INTERNAL MEDICINE

## 2024-04-30 PROCEDURE — 99213 OFFICE O/P EST LOW 20 MIN: CPT | Mod: S$GLB,,, | Performed by: INTERNAL MEDICINE

## 2024-04-30 PROCEDURE — 99999 PR PBB SHADOW E&M-EST. PATIENT-LVL IV: CPT | Mod: PBBFAC,,, | Performed by: INTERNAL MEDICINE

## 2024-04-30 NOTE — PROGRESS NOTES
Subjective   Patient ID:  Ramone Nguyen is a 63 y.o. male who presents for follow-up of Coronary Artery Disease      Coronary Artery Disease  Symptoms include leg swelling and muscle weakness.       Mr. Nguyen has no symptoms to suggest exertional angina.. He has no    symptoms of chf. He has no symptoms of dysrhythmia. He has never had    syncope.       He did have an episode of chest discomfort described as an elephant sitting on his chest in March 2016.  He was evaluated at his local hospital in [place], [place].  There was no heart attack and apparently no evidence of new coronary events.  These symptoms have not recurred and he and his wife believe they're likely due to stress that he was having at the time.     He has had cervical spine surgery and has some residual problems in both legs, worse on the right.  No recent surgeries.  Has what is probably neurogenic problems rt leg responsible for his mild leg swelling and erythema.  Needs to hold handrail to get up steps  Has balance issues and has fallen if turns around too quickly  Able to walk up to 1 mile without CV sx, including no claudication     As noted previously, he notices a purplish discoloration to his right foot when he is sitting.  He has no claudication.       He developed angioedema in several locations with ace-i, and I am   reluctant to try an ARB.      He developed acute anterior MI in [facility] sn[facility] 1997 with delay    getting to [facility] at which point LAD occluded and ptca performed. Went on    to develop large anterior scar. Was a  and cigarette smoker at    the time, both of which have been discontinued.  On Workmen's Comp b/o NOFD     Not smoking and now no social alcohol     Bought good bp device and home bps now ok - not taking much these days     CHRONIC CONDITIONS:    - Old myocardial infarction    - CHR ISCHEMIC HRT DIS NEC - with decision made by EP not to place AICD    Contraindication to ace-i and ARB    -  Other and unspecified hyperlipidemia - not at goal but has had    problems symptomatically with higher dose statin and wants to try better    diet, wt loss, etc.    - Percutaneous transluminal coronary angioplasty status    - Coronary atherosclerosis of native coronary vessel      Probably had Covid Jan 2022 sx for 2 days test positive  Still no vaccine    Current Outpatient Medications   Medication Sig Dispense Refill    ADVIL COLD AND SINUS  mg Tab TAKE ONE TABLET BY MOUTH TWICE DAILY AS NEEDED 60 each 4    aspirin (ECOTRIN) 81 MG EC tablet Take 1 tablet (81 mg total) by mouth once daily.  0    baclofen (LIORESAL) 10 MG tablet Take 10 mg by mouth 3 (three) times daily.       carvediloL (COREG) 12.5 MG tablet TAKE ONE TABLET BY MOUTH TWICE DAILY WITH MEALS 180 tablet 3    cyanocobalamin, vitamin B-12, 500 mcg TbSR Take by mouth.      fish oil-omega-3 fatty acids 300-1,000 mg capsule Take 2 g by mouth once daily.      LYRICA 75 mg capsule 75 mg 4 (four) times daily.       multivitamin capsule Take 1 capsule by mouth once daily.      rosuvastatin (CRESTOR) 40 MG Tab TAKE ONE TABLET BY MOUTH EVERY EVENING 90 tablet 3    UBIQUINOL, BULK, MISC 100 mg by Misc.(Non-Drug; Combo Route) route once daily.       cholecalciferol, vitamin D3, (VITAMIN D3) 1,000 unit capsule Take 1 capsule (1,000 Units total) by mouth once daily. (Patient not taking: Reported on 10/18/2023) 90 capsule 3    sildenafiL (VIAGRA) 100 MG tablet Take 1 tablet (100 mg total) by mouth daily as needed for Erectile Dysfunction. 30 tablet 3     No current facility-administered medications for this visit.       Review of Systems   Constitutional: Negative.   HENT: Negative.     Eyes: Negative.    Cardiovascular:  Positive for leg swelling.   Respiratory:  Positive for snoring.    Endocrine: Negative.    Hematologic/Lymphatic: Negative.    Skin: Negative.    Musculoskeletal:  Positive for back pain, falls, joint pain, muscle weakness and myalgias.  "  Gastrointestinal: Negative.    Genitourinary: Negative.    Neurological:  Positive for loss of balance.   Psychiatric/Behavioral: Negative.     Allergic/Immunologic: Negative.         Objective     Physical Exam  Constitutional:       Comments: /82   Pulse 64   Ht 5' 10" (1.778 m)   Wt 89.6 kg (197 lb 8.5 oz)   BMI 28.34 kg/m²      Neck:      Vascular: No carotid bruit or JVD.   Cardiovascular:      Rate and Rhythm: Normal rate and regular rhythm.      Pulses: Normal pulses and intact distal pulses.      Heart sounds: Normal heart sounds. No murmur heard.     No gallop.      Comments: Dorsalis pedis and posterior tibial pulses normal bilaterally  Pulmonary:      Breath sounds: Normal breath sounds. No rales.   Abdominal:      General: Bowel sounds are normal.      Palpations: Abdomen is soft.   Musculoskeletal:      Right lower leg: No edema.      Left lower leg: No edema.            Assessment and Plan     1. Myalgia    2. Atherosclerosis of native coronary artery of native heart without angina pectoris    3. Cardiomyopathy, ischemic    4. Old MI (myocardial infarction)    5. Pure hypercholesterolemia    6. Essential hypertension    7. S/P PTCA (percutaneous transluminal coronary angioplasty)        Plan:  1. Myalgia  Stable and not due to statin; continue Co Q10    2. Atherosclerosis of native coronary artery of native heart without angina pectoris  No symptoms of inducible angina, no congestive heart failure, and no dysrhythmias-all controlled and stable  Continue aspirin, carvedilol, rosuvastatin    3. Cardiomyopathy, ischemic  Stable with no symptoms of congestive heart failure on carvedilol; intolerant of ACE inhibitor with angioedema    4. Old MI (myocardial infarction)  See 2. Above    5. Pure hypercholesterolemia  Controlled on rosuvastatin    6. Essential hypertension  Controlled on carvedilol    7. S/P PTCA (percutaneous transluminal coronary angioplasty)  No recurrent symptoms     Continue " current medications and follow-up in 6 months with labs    Advance Care Planning     Date: 04/30/2024  Patient did not wish or was not able to name a surrogate decision maker or provide an Advance Care Plan.

## 2024-07-01 RX ORDER — CARVEDILOL 12.5 MG/1
TABLET ORAL
Qty: 180 TABLET | Refills: 3 | Status: SHIPPED | OUTPATIENT
Start: 2024-07-01

## 2025-02-03 DIAGNOSIS — R31.1 BENIGN MICROSCOPIC HEMATURIA: Primary | ICD-10-CM

## 2025-02-05 RX ORDER — ROSUVASTATIN CALCIUM 40 MG/1
40 TABLET, COATED ORAL NIGHTLY
Qty: 90 TABLET | Refills: 3 | Status: SHIPPED | OUTPATIENT
Start: 2025-02-05

## 2025-02-19 ENCOUNTER — HOSPITAL ENCOUNTER (OUTPATIENT)
Dept: RADIOLOGY | Facility: HOSPITAL | Age: 65
Discharge: HOME | End: 2025-02-19
Attending: SPECIALIST
Payer: MEDICARE

## 2025-02-19 DIAGNOSIS — R31.1 BENIGN MICROSCOPIC HEMATURIA: ICD-10-CM

## 2025-02-19 PROCEDURE — 74400 UROGRAPHY IV +-KUB TOMOG: CPT | Mod: TC

## 2025-02-19 PROCEDURE — 25500020 PHARM REV CODE 255: Performed by: SPECIALIST

## 2025-02-19 RX ADMIN — IOHEXOL 100 ML: 300 INJECTION, SOLUTION INTRAVENOUS at 10:02

## 2025-06-16 ENCOUNTER — LAB VISIT (OUTPATIENT)
Dept: LAB | Facility: HOSPITAL | Age: 65
End: 2025-06-16
Attending: INTERNAL MEDICINE
Payer: MEDICARE

## 2025-06-16 DIAGNOSIS — I25.10 ATHEROSCLEROSIS OF NATIVE CORONARY ARTERY OF NATIVE HEART WITHOUT ANGINA PECTORIS: ICD-10-CM

## 2025-06-16 LAB
ALBUMIN SERPL BCP-MCNC: 3.9 G/DL (ref 3.5–5.2)
ALP SERPL-CCNC: 53 UNIT/L (ref 40–150)
ALT SERPL W/O P-5'-P-CCNC: 15 UNIT/L (ref 10–44)
ANION GAP (OHS): 8 MMOL/L (ref 8–16)
AST SERPL-CCNC: 26 UNIT/L (ref 11–45)
BILIRUB SERPL-MCNC: 0.4 MG/DL (ref 0.1–1)
BUN SERPL-MCNC: 20 MG/DL (ref 8–23)
CALCIUM SERPL-MCNC: 8.8 MG/DL (ref 8.7–10.5)
CHLORIDE SERPL-SCNC: 107 MMOL/L (ref 95–110)
CHOLEST SERPL-MCNC: 124 MG/DL (ref 120–199)
CHOLEST/HDLC SERPL: 2.5 {RATIO} (ref 2–5)
CO2 SERPL-SCNC: 24 MMOL/L (ref 23–29)
CREAT SERPL-MCNC: 1.4 MG/DL (ref 0.5–1.4)
GFR SERPLBLD CREATININE-BSD FMLA CKD-EPI: 56 ML/MIN/1.73/M2
GLUCOSE SERPL-MCNC: 91 MG/DL (ref 70–110)
HDLC SERPL-MCNC: 49 MG/DL (ref 40–75)
HDLC SERPL: 39.5 % (ref 20–50)
LDLC SERPL CALC-MCNC: 49.6 MG/DL (ref 63–159)
NONHDLC SERPL-MCNC: 75 MG/DL
POTASSIUM SERPL-SCNC: 4.3 MMOL/L (ref 3.5–5.1)
PROT SERPL-MCNC: 6.9 GM/DL (ref 6–8.4)
SODIUM SERPL-SCNC: 139 MMOL/L (ref 136–145)
TRIGL SERPL-MCNC: 127 MG/DL (ref 30–150)

## 2025-06-16 PROCEDURE — 80061 LIPID PANEL: CPT

## 2025-06-16 PROCEDURE — 36415 COLL VENOUS BLD VENIPUNCTURE: CPT | Mod: PO

## 2025-06-16 PROCEDURE — 80053 COMPREHEN METABOLIC PANEL: CPT

## 2025-06-18 ENCOUNTER — OFFICE VISIT (OUTPATIENT)
Dept: CARDIOLOGY | Facility: CLINIC | Age: 65
End: 2025-06-18
Payer: COMMERCIAL

## 2025-06-18 VITALS
OXYGEN SATURATION: 97 % | SYSTOLIC BLOOD PRESSURE: 119 MMHG | BODY MASS INDEX: 28.02 KG/M2 | HEIGHT: 70 IN | HEART RATE: 61 BPM | DIASTOLIC BLOOD PRESSURE: 83 MMHG | WEIGHT: 195.75 LBS

## 2025-06-18 DIAGNOSIS — Z98.61 S/P PTCA (PERCUTANEOUS TRANSLUMINAL CORONARY ANGIOPLASTY): ICD-10-CM

## 2025-06-18 DIAGNOSIS — E78.49 OTHER HYPERLIPIDEMIA: ICD-10-CM

## 2025-06-18 DIAGNOSIS — I10 ESSENTIAL HYPERTENSION: ICD-10-CM

## 2025-06-18 DIAGNOSIS — I25.5 CARDIOMYOPATHY, ISCHEMIC: ICD-10-CM

## 2025-06-18 DIAGNOSIS — G95.9 CERVICAL MYELOPATHY: ICD-10-CM

## 2025-06-18 DIAGNOSIS — E78.00 PURE HYPERCHOLESTEROLEMIA: ICD-10-CM

## 2025-06-18 DIAGNOSIS — I25.10 ATHEROSCLEROSIS OF NATIVE CORONARY ARTERY OF NATIVE HEART WITHOUT ANGINA PECTORIS: Primary | ICD-10-CM

## 2025-06-18 DIAGNOSIS — I25.2 OLD MI (MYOCARDIAL INFARCTION): ICD-10-CM

## 2025-06-18 PROCEDURE — 99999 PR PBB SHADOW E&M-EST. PATIENT-LVL IV: CPT | Mod: PBBFAC,,, | Performed by: INTERNAL MEDICINE

## 2025-06-18 PROCEDURE — 99215 OFFICE O/P EST HI 40 MIN: CPT | Mod: S$GLB,,, | Performed by: INTERNAL MEDICINE

## 2025-06-18 RX ORDER — METOPROLOL SUCCINATE 25 MG/1
25 TABLET, EXTENDED RELEASE ORAL EVERY MORNING
Qty: 90 TABLET | Refills: 3 | Status: SHIPPED | OUTPATIENT
Start: 2025-06-18 | End: 2026-06-18

## 2025-06-18 NOTE — PROGRESS NOTES
Subjective   Patient ID:  Ramone Nguyen is a 64 y.o. male who presents for follow-up of CAD, ischemic cardiomyopathy    HPI    Mr. Nguyen has no symptoms to suggest exertional angina.. He has no    symptoms of chf. He has no symptoms of dysrhythmia. He has never had    syncope.       He did have an episode of chest discomfort described as an elephant sitting on his chest in March 2016.  He was evaluated at his local hospital in [place], [place].  There was no heart attack and apparently no evidence of new coronary events.  These symptoms have not recurred and he and his wife believe they're likely due to stress that he was having at the time.     He has had cervical spine surgery and has some residual problems in both legs, worse on the right, but now affecting the left leg as well.  No recent surgeries.  Has what is probably neurogenic problems rt leg responsible for his mild leg swelling and erythema.  Needs to hold handrail to get up steps  Has balance issues and has fallen if turns around too quickly  Able to walk up to 1 mile without CV sx, including no claudication  He is able yd work several days week without any cardiac symptoms.     As noted previously, he notices a purplish discoloration to his right foot when he is sitting.  He has no claudication.       He developed angioedema in several locations with ace-i, and I am   reluctant to try an ARB.      He developed acute anterior MI in [facility] long-term 1997 with delay    getting to [facility] at which point LAD occluded and ptca performed. Went on    to develop large anterior scar. Was a  and cigarette smoker at    the time, both of which have been discontinued.  On Workmen's Comp b/o NOFD     Not smoking and now no social alcohol     Bought good bp device and home bps now ok - not taking much these days     CHRONIC CONDITIONS:    - Old myocardial infarction    - [facility] ISCHEMIC HRT DIS NEC - with decision made by EP not to place AICD   "  Contraindication to ace-i and ARB    - Other and unspecified hyperlipidemia - not at goal but has had    problems symptomatically with higher dose statin and wants to try better    diet, wt loss, etc.    - Percutaneous transluminal coronary angioplasty status    - Coronary atherosclerosis of native coronary vessel      Probably had Covid Jan 2022 sx for 2 days test positive  Still no vaccine     Current Medications[1]      Date/Time Component Value Flag Lab Status   05/13/19 1615 TSH 1.687 -- Final result   12/13/22 0954 PSA 0.89 -- Final result   06/16/25 0849 HDL 49 -- Final result   06/16/25 0849 CHOL 124 -- Final result   06/16/25 0849 TRIG 127 -- Final result   06/16/25 0849 LDLCALC 49.6 Important  Low      Review of Systems   Constitutional: Negative for decreased appetite.   Cardiovascular:  Negative for chest pain, claudication, dyspnea on exertion, irregular heartbeat, leg swelling, near-syncope, orthopnea, palpitations, paroxysmal nocturnal dyspnea and syncope.   Respiratory:  Negative for cough, shortness of breath, sleep disturbances due to breathing and snoring.    Hematologic/Lymphatic: Negative for bleeding problem.   Skin: Negative.    Musculoskeletal:  Negative for falls, gout and muscle cramps.   Gastrointestinal:  Negative for abdominal pain, change in bowel habit and dysphagia.   Neurological:  Negative for brief paralysis, focal weakness and loss of balance.          Objective     Physical Exam  Constitutional:       Comments: /83   Pulse 61   Ht 5' 10" (1.778 m)   Wt 88.8 kg (195 lb 12.3 oz)   SpO2 97%   BMI 28.09 kg/m²      Neck:      Vascular: No carotid bruit or JVD.   Cardiovascular:      Rate and Rhythm: Normal rate and regular rhythm.      Pulses: Intact distal pulses.      Heart sounds: Normal heart sounds. No murmur heard.     No gallop.   Pulmonary:      Breath sounds: Normal breath sounds. No rales.   Abdominal:      General: Bowel sounds are normal.      Palpations: " Abdomen is soft.   Musculoskeletal:      Right lower leg: No edema.      Left lower leg: No edema.            Assessment and Plan     1. Atherosclerosis of native coronary artery of native heart without angina pectoris    2. Cardiomyopathy, ischemic he has never had symptoms of congestive heart failure   3. Old MI (myocardial infarction)    4. Pure hypercholesterolemia -controlled   5. Other hyperlipidemia    6. Essential hypertension -controlled   7. S/P PTCA (percutaneous transluminal coronary angioplasty)    8. Cervical myelopathy        Plan:  Continue same meds  I will see every 6 months with labs-ordered       Advance Care Planning     Date: 06/18/2025  Patient did not wish or was not able to name a surrogate decision maker or provide an Advance Care Plan.                  [1]   Current Outpatient Medications   Medication Sig Dispense Refill    ADVIL COLD AND SINUS  mg Tab TAKE ONE TABLET BY MOUTH TWICE DAILY AS NEEDED 60 each 4    aspirin (ECOTRIN) 81 MG EC tablet Take 1 tablet (81 mg total) by mouth once daily.  0    baclofen (LIORESAL) 10 MG tablet Take 10 mg by mouth 3 (three) times daily.       fish oil-omega-3 fatty acids 300-1,000 mg capsule Take 2 g by mouth once daily.      LYRICA 75 mg capsule 75 mg 4 (four) times daily.       multivitamin capsule Take 1 capsule by mouth once daily.      rosuvastatin (CRESTOR) 40 MG Tab TAKE ONE TABLET BY MOUTH EVERY EVENING 90 tablet 3    sildenafiL (VIAGRA) 100 MG tablet Take 1 tablet (100 mg total) by mouth daily as needed for Erectile Dysfunction. 30 tablet 3    UBIQUINOL, BULK, MISC 100 mg by Misc.(Non-Drug; Combo Route) route once daily.       metoprolol succinate (TOPROL-XL) 25 MG 24 hr tablet Take 1 tablet (25 mg total) by mouth every morning. 90 tablet 3     No current facility-administered medications for this visit.

## 2025-08-06 ENCOUNTER — OFFICE VISIT (OUTPATIENT)
Dept: PODIATRY | Facility: CLINIC | Age: 65
End: 2025-08-06
Payer: MEDICARE

## 2025-08-06 VITALS — BODY MASS INDEX: 27.65 KG/M2 | WEIGHT: 193.13 LBS | HEIGHT: 70 IN | OXYGEN SATURATION: 99 %

## 2025-08-06 DIAGNOSIS — L85.1 ACQUIRED KERATODERMA: ICD-10-CM

## 2025-08-06 DIAGNOSIS — M79.674 PAIN OF TOE OF RIGHT FOOT: ICD-10-CM

## 2025-08-06 DIAGNOSIS — L60.0 INGROWN NAIL: Primary | ICD-10-CM

## 2025-08-06 DIAGNOSIS — M21.371 FOOT DROP, RIGHT FOOT: ICD-10-CM

## 2025-08-06 PROCEDURE — 99999 PR PBB SHADOW E&M-EST. PATIENT-LVL III: CPT | Mod: PBBFAC,,, | Performed by: PODIATRIST

## 2025-08-06 PROCEDURE — 99203 OFFICE O/P NEW LOW 30 MIN: CPT | Mod: S$PBB,,, | Performed by: PODIATRIST

## 2025-08-06 PROCEDURE — 99213 OFFICE O/P EST LOW 20 MIN: CPT | Mod: PBBFAC,PN | Performed by: PODIATRIST

## (undated) DEVICE — DRESSING LEUKOPLAST FLEX 1X3IN